# Patient Record
Sex: MALE | Employment: FULL TIME | URBAN - METROPOLITAN AREA
[De-identification: names, ages, dates, MRNs, and addresses within clinical notes are randomized per-mention and may not be internally consistent; named-entity substitution may affect disease eponyms.]

---

## 2017-10-10 ENCOUNTER — TRANSFERRED RECORDS (OUTPATIENT)
Dept: HEALTH INFORMATION MANAGEMENT | Facility: CLINIC | Age: 47
End: 2017-10-10

## 2018-05-03 ENCOUNTER — OFFICE VISIT (OUTPATIENT)
Dept: FAMILY MEDICINE | Facility: CLINIC | Age: 48
End: 2018-05-03
Payer: COMMERCIAL

## 2018-05-03 VITALS
SYSTOLIC BLOOD PRESSURE: 130 MMHG | BODY MASS INDEX: 26.67 KG/M2 | RESPIRATION RATE: 18 BRPM | OXYGEN SATURATION: 98 % | HEART RATE: 71 BPM | TEMPERATURE: 98.3 F | HEIGHT: 68 IN | WEIGHT: 176 LBS | DIASTOLIC BLOOD PRESSURE: 72 MMHG

## 2018-05-03 DIAGNOSIS — Z23 NEED FOR PROPHYLACTIC VACCINATION WITH TETANUS-DIPHTHERIA (TD): ICD-10-CM

## 2018-05-03 DIAGNOSIS — G47.00 INSOMNIA, UNSPECIFIED TYPE: ICD-10-CM

## 2018-05-03 DIAGNOSIS — F33.0 MILD EPISODE OF RECURRENT MAJOR DEPRESSIVE DISORDER (H): ICD-10-CM

## 2018-05-03 DIAGNOSIS — D17.30 LIPOMA OF SKIN AND SUBCUTANEOUS TISSUE: ICD-10-CM

## 2018-05-03 DIAGNOSIS — F10.21 HISTORY OF ALCOHOL DEPENDENCE (H): ICD-10-CM

## 2018-05-03 DIAGNOSIS — F41.9 ANXIETY: Primary | ICD-10-CM

## 2018-05-03 PROCEDURE — 99203 OFFICE O/P NEW LOW 30 MIN: CPT | Performed by: FAMILY MEDICINE

## 2018-05-03 RX ORDER — TRAZODONE HYDROCHLORIDE 150 MG/1
150 TABLET ORAL AT BEDTIME
Qty: 90 TABLET | Refills: 1 | Status: SHIPPED | OUTPATIENT
Start: 2018-05-03 | End: 2018-11-05

## 2018-05-03 RX ORDER — BUPROPION HYDROCHLORIDE 150 MG/1
150 TABLET ORAL EVERY MORNING
Qty: 90 TABLET | Refills: 1 | Status: SHIPPED | OUTPATIENT
Start: 2018-05-03 | End: 2018-11-05

## 2018-05-03 RX ORDER — BUSPIRONE HYDROCHLORIDE 15 MG/1
22.5 TABLET ORAL 2 TIMES DAILY
Qty: 270 TABLET | Refills: 1 | Status: SHIPPED | OUTPATIENT
Start: 2018-05-03 | End: 2018-11-05

## 2018-05-03 ASSESSMENT — ANXIETY QUESTIONNAIRES
GAD7 TOTAL SCORE: 3
2. NOT BEING ABLE TO STOP OR CONTROL WORRYING: SEVERAL DAYS
IF YOU CHECKED OFF ANY PROBLEMS ON THIS QUESTIONNAIRE, HOW DIFFICULT HAVE THESE PROBLEMS MADE IT FOR YOU TO DO YOUR WORK, TAKE CARE OF THINGS AT HOME, OR GET ALONG WITH OTHER PEOPLE: NOT DIFFICULT AT ALL
7. FEELING AFRAID AS IF SOMETHING AWFUL MIGHT HAPPEN: NOT AT ALL
5. BEING SO RESTLESS THAT IT IS HARD TO SIT STILL: NOT AT ALL
6. BECOMING EASILY ANNOYED OR IRRITABLE: NOT AT ALL
3. WORRYING TOO MUCH ABOUT DIFFERENT THINGS: SEVERAL DAYS
1. FEELING NERVOUS, ANXIOUS, OR ON EDGE: SEVERAL DAYS

## 2018-05-03 ASSESSMENT — PAIN SCALES - GENERAL: PAINLEVEL: NO PAIN (0)

## 2018-05-03 ASSESSMENT — PATIENT HEALTH QUESTIONNAIRE - PHQ9: 5. POOR APPETITE OR OVEREATING: NOT AT ALL

## 2018-05-03 NOTE — PROGRESS NOTES
"  SUBJECTIVE:   Edmar Forrest is a 47 year old male who presents to clinic today for the following health issues:      New patient/Transfer of Care from [place].   Clinic: [facility]   #[phone]    Depression and Anxiety Follow-Up    Status since last visit: No change    Other associated symptoms: Insomnia     Complicating factors:     Significant life event: Yes-  Moving from [place]      Current substance abuse: None    PHQ-9 5/3/2018   Total Score 0   Q9: Suicide Ideation Not at all     MANOHAR-7 SCORE 5/3/2018   Total Score 3     PHQ-9  English  PHQ-9   Any Language  MANOHAR-7  Suicide Assessment Five-step Evaluation and Treatment (SAFE-T)    Amount of exercise or physical activity: 6-7 days/week for an average of greater than 60 minutes    Problems taking medications regularly: No    Medication side effects: none    Diet: regular (no restrictions)    Pt is new to the clinic as he has recently moved from [place]. He needs refills for prescriptions today.     Mood: He has been taking Wellbutrin XL for about 4 years due to depression. Cardio and weight lifting also help control mood. Thinks he has been depressed for a long time, but did not realize until after becoming sober. Anxiety has been the biggest concern- taking buspirone for this. He just bought and sold two houses and is in the process of moving his parents to [place]. This is stressful but manageable/within normal limits. He has also been taking trazodone nightly for 4 years to help with sleep.   Denies: over-exercising    Etoh use: He has been sober for 4 years. Support group consists of family and exercise. Pt tried AA for a while but did not enjoy it. He has no urge to drink and is scared of relapse as he would \"lose everything.\"    Last physical was 2 years ago. Cholesterol and BP were elevated in the past as he was overweight/obese and an alcoholic- numbers have been better since sober.     Pt fell off a bed of a truck onto a forklift when " younger and is wondering if this could have caused the lipoma in his back. Lipoma doesn't cause him pain and he is unsure if it is changing in size.       Problem list and histories reviewed & adjusted, as indicated.  Additional history: as documented    Patient Active Problem List   Diagnosis     History of alcohol dependence (H)     Mild episode of recurrent major depressive disorder (H)     Anxiety     Lipoma of skin and subcutaneous tissue     Insomnia, unspecified type     Past Surgical History:   Procedure Laterality Date     ARTHROSCOPY KNEE RT/LT         Social History   Substance Use Topics     Smoking status: Never Smoker     Smokeless tobacco: Never Used     Alcohol use No     Family History   Problem Relation Age of Onset     Breast Cancer Mother 70     Hypertension Mother      HEART DISEASE Father      ? rhythm     Arthritis Father      Nephrolithiasis Father      Nephrolithiasis Sister      Chronic Obstructive Pulmonary Disease Brother       age 50 due to complications from copd. heavy smoker     Alcoholism Brother          Current Outpatient Prescriptions   Medication Sig Dispense Refill     buPROPion (WELLBUTRIN XL) 150 MG 24 hr tablet Take 1 tablet (150 mg) by mouth every morning 90 tablet 1     BUPROPION HCL PO Take 150 mg by mouth daily       busPIRone (BUSPAR) 15 MG tablet Take 1.5 tablets (22.5 mg) by mouth 2 times daily 270 tablet 1     BUSPIRONE HCL PO Take 15 mg by mouth 2 times daily 1- tabs       traZODone (DESYREL) 150 MG tablet Take 1 tablet (150 mg) by mouth At Bedtime 90 tablet 1     TRAZODONE HCL PO Take 150 mg by mouth At Bedtime       No Known Allergies    Reviewed and updated as needed this visit by clinical staff  Tobacco  Allergies  Meds  Med Hx  Surg Hx  Fam Hx  Soc Hx      Reviewed and updated as needed this visit by Provider Tobacco  Allergies  Meds  Med Hx  Surg Hx  Fam Hx  Soc Hx            ROS:  Constitutional, HEENT, cardiovascular, pulmonary, gi and  "gu systems are negative, except as otherwise noted.    This document serves as a record of the services and decisions personally performed and made by Hortencia Landers MD. It was created on her behalf by Karyna Aguero, a trained medical scribe. The creation of this document is based the provider's statements to the medical scribe.  Karyna Aguero May 3, 2018 8:18 AM      OBJECTIVE:     /72 (BP Location: Right arm, Patient Position: Sitting, Cuff Size: Adult Large)  Pulse 71  Temp 98.3  F (36.8  C) (Oral)  Resp 18  Ht 1.725 m (5' 7.91\")  Wt 79.8 kg (176 lb)  SpO2 98%  BMI 26.83 kg/m2  Body mass index is 26.83 kg/(m^2).  GENERAL: healthy, alert and no distress, overweight  NECK: no adenopathy, no asymmetry, masses, or scars and thyroid normal to palpation  RESP: lungs clear to auscultation - no rales, rhonchi or wheezes  CV: regular rate and rhythm, normal S1 S2, no S3 or S4, no murmur, click or rub, no peripheral edema and peripheral pulses strong  MS: 6-7 cm subcutaneous soft, smooth mass of left mid/upper back.   SKIN: no suspicious lesions or rashes to visible skin  PSYCH: mentation appears normal, affect normal/bright    Diagnostic Test Results:  No results found for this or any previous visit (from the past 24 hour(s)).    ASSESSMENT/PLAN:     1. Anxiety  2. Mild episode of recurrent major depressive disorder (H)  Has been well Controlled. Continue same medication.   - buPROPion (WELLBUTRIN XL) 150 MG 24 hr tablet; Take 1 tablet (150 mg) by mouth every morning  Dispense: 90 tablet; Refill: 1  - traZODone (DESYREL) 150 MG tablet; Take 1 tablet (150 mg) by mouth At Bedtime  Dispense: 90 tablet; Refill: 1    3. History of alcohol dependence (H)   sober for 4 years. Not interested in AA or other formal f/u care    4. Need for prophylactic vaccination with tetanus-diphtheria (TD)  Thinks he is utd. will have records sent from pharmacy and previous clinic.     5. Lipoma of skin and subcutaneous " tissue  pt declines f/u for this. Reviewed removal option- would refer to gen surgery. He will f/u if growing or becoming painful    6. Insomnia, unspecified type   Controlled. Continue same medication.   - traZODone (DESYREL) 150 MG tablet; Take 1 tablet (150 mg) by mouth At Bedtime  Dispense: 90 tablet; Refill: 1    Patient Instructions   Med check in 6 months (Jc).  Schedule this as a physical.       The information in this document, created by the medical scribe for me, accurately reflects the services I personally performed and the decisions made by me. I have reviewed and approved this document for accuracy.   MD Hortencia Mart MD  [facility]

## 2018-05-03 NOTE — MR AVS SNAPSHOT
"              After Visit Summary   5/3/2018    Edmar Forrest    MRN: [id number]           Patient Information     Date Of Birth          1970        Visit Information        Provider Department      5/3/2018 7:40 AM Hortencia Landers MD [facility]        Today's Diagnoses     Anxiety    -  1    Mild episode of recurrent major depressive disorder (H)        History of alcohol dependence (H)        Need for prophylactic vaccination with tetanus-diphtheria (TD)        Lipoma of skin and subcutaneous tissue        Insomnia, unspecified type          Care Instructions    Med check in 6 months (Jc).  Schedule this as a physical.           Follow-ups after your visit        Who to contact     If you have questions or need follow up information about today's clinic visit or your schedule please contact [facility] directly at [phone].  Normal or non-critical lab and imaging results will be communicated to you by [employer], letter or phone within 4 business days after the clinic has received the results. If you do not hear from us within 7 days, please contact the clinic through [employer] or phone. If you have a critical or abnormal lab result, we will notify you by phone as soon as possible.  Submit refill requests through [employer] or call your pharmacy and they will forward the refill request to us. Please allow 3 business days for your refill to be completed.          Additional Information About Your Visit        [employer] Information     [employer] lets you send messages to your doctor, view your test results, renew your prescriptions, schedule appointments and more. To sign up, go to [email or web address] . Click on \"Log in\" on the left side of the screen, which will take you to the Welcome page. Then click on \"Sign up Now\" on the right side of the page.     You will be asked to enter the access code listed below, as well as some personal information. Please follow " "the directions to create your username and password.     Your access code is: [email or web address]  Expires: 2018  8:46 AM     Your access code will  in 90 days. If you need help or a new code, please call your [facility] clinic or [phone].        Care EveryWhere ID     This is your Care EveryWhere ID. This could be used by other organizations to access your [facility] medical records  [id number]        Your Vitals Were     Pulse Temperature Respirations Height Pulse Oximetry BMI (Body Mass Index)    71 98.3  F (36.8  C) (Oral) 18 1.725 m (5' 7.91\") 98% 26.83 kg/m2       Blood Pressure from Last 3 Encounters:   18 130/72    Weight from Last 3 Encounters:   18 79.8 kg (176 lb)              Today, you had the following     No orders found for display         Today's Medication Changes          These changes are accurate as of 5/3/18  8:46 AM.  If you have any questions, ask your nurse or doctor.               These medicines have changed or have updated prescriptions.        Dose/Directions    * BUPROPION HCL PO   This may have changed:  Another medication with the same name was added. Make sure you understand how and when to take each.   Changed by:  Hortencia Landers MD        Dose:  150 mg   Take 150 mg by mouth daily   Refills:  0       * buPROPion 150 MG 24 hr tablet   Commonly known as:  WELLBUTRIN XL   This may have changed:  You were already taking a medication with the same name, and this prescription was added. Make sure you understand how and when to take each.   Used for:  Mild episode of recurrent major depressive disorder (H)   Changed by:  Hortencia Landers MD        Dose:  150 mg   Take 1 tablet (150 mg) by mouth every morning   Quantity:  90 tablet   Refills:  1       * BUSPIRONE HCL PO   This may have changed:  Another medication with the same name was added. Make sure you understand how and when to take each.   Changed by:  Hortencia Landers MD        " Dose:  15 mg   Take 15 mg by mouth 2 times daily 1-1/12 tabs   Refills:  0       * busPIRone 15 MG tablet   Commonly known as:  BUSPAR   This may have changed:  You were already taking a medication with the same name, and this prescription was added. Make sure you understand how and when to take each.   Used for:  Anxiety   Changed by:  Hortencia Landers MD        Dose:  22.5 mg   Take 1.5 tablets (22.5 mg) by mouth 2 times daily   Quantity:  270 tablet   Refills:  1       * TRAZODONE HCL PO   This may have changed:  Another medication with the same name was added. Make sure you understand how and when to take each.   Changed by:  Hortencia Landers MD        Dose:  150 mg   Take 150 mg by mouth At Bedtime   Refills:  0       * traZODone 150 MG tablet   Commonly known as:  DESYREL   This may have changed:  You were already taking a medication with the same name, and this prescription was added. Make sure you understand how and when to take each.   Used for:  Mild episode of recurrent major depressive disorder (H), Insomnia, unspecified type   Changed by:  Hortencia Landers MD        Dose:  150 mg   Take 1 tablet (150 mg) by mouth At Bedtime   Quantity:  90 tablet   Refills:  1       * Notice:  This list has 6 medication(s) that are the same as other medications prescribed for you. Read the directions carefully, and ask your doctor or other care provider to review them with you.         Where to get your medicines      These medications were sent to [id number] - [place], [place] - [address]  [address] [place] [address]     Phone:  [phone]     buPROPion 150 MG 24 hr tablet    busPIRone 15 MG tablet    traZODone 150 MG tablet                Primary Care Provider Office Phone # Fax #    [facility] [phone]       [address] [facility] [address]        Equal Access to Services     MAO HERNANDEZ AH: Willa campbell  Socheikhali, wazarinada luqadaha, qaybta kaalmada sabrina, addie hearn jaspreetbel laAkshatmark [place]ano. So [facility] [phone].    ATENCIÓN: Si efraín moses, tiene a capps disposición servicios gratuitos de asistencia lingüística. Shiela al [phone].    We comply with applicable federal civil rights laws and [place] laws. We do not discriminate on the basis of race, color, national origin, age, disability, sex, sexual orientation, or gender identity.            Thank you!     Thank you for choosing [facility]  for your care. Our goal is always to provide you with excellent care. Hearing back from our patients is one way we can continue to improve our services. Please take a few minutes to complete the written survey that you may receive in the mail after your visit with us. Thank you!             Your Updated Medication List - Protect others around you: Learn how to safely use, store and throw away your medicines at www.disposemymeds.org.          This list is accurate as of 5/3/18  8:46 AM.  Always use your most recent med list.                   Brand Name Dispense Instructions for use Diagnosis    * BUPROPION HCL PO      Take 150 mg by mouth daily        * buPROPion 150 MG 24 hr tablet    WELLBUTRIN XL    90 tablet    Take 1 tablet (150 mg) by mouth every morning    Mild episode of recurrent major depressive disorder (H)       * BUSPIRONE HCL PO      Take 15 mg by mouth 2 times daily 1-1/12 tabs        * busPIRone 15 MG tablet    BUSPAR    270 tablet    Take 1.5 tablets (22.5 mg) by mouth 2 times daily    Anxiety       * TRAZODONE HCL PO      Take 150 mg by mouth At Bedtime        * traZODone 150 MG tablet    DESYREL    90 tablet    Take 1 tablet (150 mg) by mouth At Bedtime    Mild episode of recurrent major depressive disorder (H), Insomnia, unspecified type       * Notice:  This list has 6 medication(s) that are the same as other medications prescribed for you. Read the directions carefully, and ask  your doctor or other care provider to review them with you.

## 2018-05-04 PROBLEM — G47.00 INSOMNIA, UNSPECIFIED TYPE: Status: ACTIVE | Noted: 2018-05-04

## 2018-05-04 ASSESSMENT — PATIENT HEALTH QUESTIONNAIRE - PHQ9: SUM OF ALL RESPONSES TO PHQ QUESTIONS 1-9: 0

## 2018-05-04 ASSESSMENT — ANXIETY QUESTIONNAIRES: GAD7 TOTAL SCORE: 3

## 2018-10-11 ENCOUNTER — DOCUMENTATION ONLY (OUTPATIENT)
Dept: LAB | Facility: CLINIC | Age: 48
End: 2018-10-11

## 2018-10-11 DIAGNOSIS — F33.0 MILD EPISODE OF RECURRENT MAJOR DEPRESSIVE DISORDER (H): ICD-10-CM

## 2018-10-11 DIAGNOSIS — F10.21 HISTORY OF ALCOHOL DEPENDENCE (H): Primary | ICD-10-CM

## 2018-10-11 DIAGNOSIS — F41.9 ANXIETY: ICD-10-CM

## 2018-10-11 DIAGNOSIS — Z00.00 ENCOUNTER FOR ROUTINE ADULT HEALTH EXAMINATION WITHOUT ABNORMAL FINDINGS: ICD-10-CM

## 2018-10-11 NOTE — PROGRESS NOTES
Please place or confirm orders for upcoming lab appointment on 10/25/2018 patient coming in for pre-visit labs    Thank You  Rajwinder MCPHERSON CMA.

## 2018-10-29 DIAGNOSIS — F33.0 MILD EPISODE OF RECURRENT MAJOR DEPRESSIVE DISORDER (H): ICD-10-CM

## 2018-10-29 DIAGNOSIS — F41.9 ANXIETY: ICD-10-CM

## 2018-10-29 DIAGNOSIS — Z00.00 ENCOUNTER FOR ROUTINE ADULT HEALTH EXAMINATION WITHOUT ABNORMAL FINDINGS: ICD-10-CM

## 2018-10-29 DIAGNOSIS — F10.21 HISTORY OF ALCOHOL DEPENDENCE (H): ICD-10-CM

## 2018-10-29 LAB
ALBUMIN SERPL-MCNC: 3.8 G/DL (ref 3.4–5)
ALP SERPL-CCNC: 34 U/L (ref 40–150)
ALT SERPL W P-5'-P-CCNC: 40 U/L (ref 0–70)
ANION GAP SERPL CALCULATED.3IONS-SCNC: 6 MMOL/L (ref 3–14)
AST SERPL W P-5'-P-CCNC: 24 U/L (ref 0–45)
BASOPHILS # BLD AUTO: 0.1 10E9/L (ref 0–0.2)
BASOPHILS NFR BLD AUTO: 1.8 %
BILIRUB SERPL-MCNC: 0.9 MG/DL (ref 0.2–1.3)
BUN SERPL-MCNC: 15 MG/DL (ref 7–30)
CALCIUM SERPL-MCNC: 8.5 MG/DL (ref 8.5–10.1)
CHLORIDE SERPL-SCNC: 109 MMOL/L (ref 94–109)
CHOLEST SERPL-MCNC: 183 MG/DL
CO2 SERPL-SCNC: 29 MMOL/L (ref 20–32)
CREAT SERPL-MCNC: 0.9 MG/DL (ref 0.66–1.25)
DIFFERENTIAL METHOD BLD: NORMAL
EOSINOPHIL # BLD AUTO: 0.5 10E9/L (ref 0–0.7)
EOSINOPHIL NFR BLD AUTO: 10 %
ERYTHROCYTE [DISTWIDTH] IN BLOOD BY AUTOMATED COUNT: 12.6 % (ref 10–15)
GFR SERPL CREATININE-BSD FRML MDRD: 90 ML/MIN/1.7M2
GLUCOSE SERPL-MCNC: 103 MG/DL (ref 70–99)
HCT VFR BLD AUTO: 42.4 % (ref 40–53)
HDLC SERPL-MCNC: 79 MG/DL
HGB BLD-MCNC: 14.1 G/DL (ref 13.3–17.7)
LDLC SERPL CALC-MCNC: 94 MG/DL
LYMPHOCYTES # BLD AUTO: 2.1 10E9/L (ref 0.8–5.3)
LYMPHOCYTES NFR BLD AUTO: 42.1 %
MCH RBC QN AUTO: 32 PG (ref 26.5–33)
MCHC RBC AUTO-ENTMCNC: 33.3 G/DL (ref 31.5–36.5)
MCV RBC AUTO: 96 FL (ref 78–100)
MONOCYTES # BLD AUTO: 0.4 10E9/L (ref 0–1.3)
MONOCYTES NFR BLD AUTO: 8.6 %
NEUTROPHILS # BLD AUTO: 1.9 10E9/L (ref 1.6–8.3)
NEUTROPHILS NFR BLD AUTO: 37.5 %
NONHDLC SERPL-MCNC: 104 MG/DL
PLATELET # BLD AUTO: 276 10E9/L (ref 150–450)
POTASSIUM SERPL-SCNC: 4.3 MMOL/L (ref 3.4–5.3)
PROT SERPL-MCNC: 6.2 G/DL (ref 6.8–8.8)
RBC # BLD AUTO: 4.4 10E12/L (ref 4.4–5.9)
SODIUM SERPL-SCNC: 144 MMOL/L (ref 133–144)
TRIGL SERPL-MCNC: 51 MG/DL
TSH SERPL DL<=0.005 MIU/L-ACNC: 0.96 MU/L (ref 0.4–4)
WBC # BLD AUTO: 5 10E9/L (ref 4–11)

## 2018-10-29 PROCEDURE — 84443 ASSAY THYROID STIM HORMONE: CPT | Performed by: FAMILY MEDICINE

## 2018-10-29 PROCEDURE — 80061 LIPID PANEL: CPT | Performed by: FAMILY MEDICINE

## 2018-10-29 PROCEDURE — 85025 COMPLETE CBC W/AUTO DIFF WBC: CPT | Performed by: FAMILY MEDICINE

## 2018-10-29 PROCEDURE — 80053 COMPREHEN METABOLIC PANEL: CPT | Performed by: FAMILY MEDICINE

## 2018-10-29 PROCEDURE — 36415 COLL VENOUS BLD VENIPUNCTURE: CPT | Performed by: FAMILY MEDICINE

## 2018-11-05 ENCOUNTER — OFFICE VISIT (OUTPATIENT)
Dept: FAMILY MEDICINE | Facility: CLINIC | Age: 48
End: 2018-11-05
Payer: COMMERCIAL

## 2018-11-05 VITALS
HEIGHT: 68 IN | OXYGEN SATURATION: 98 % | HEART RATE: 78 BPM | RESPIRATION RATE: 16 BRPM | WEIGHT: 180 LBS | BODY MASS INDEX: 27.28 KG/M2 | SYSTOLIC BLOOD PRESSURE: 123 MMHG | TEMPERATURE: 98.3 F | DIASTOLIC BLOOD PRESSURE: 75 MMHG

## 2018-11-05 DIAGNOSIS — Z00.00 ENCOUNTER FOR ROUTINE ADULT HEALTH EXAMINATION WITHOUT ABNORMAL FINDINGS: Primary | ICD-10-CM

## 2018-11-05 DIAGNOSIS — F41.9 ANXIETY: ICD-10-CM

## 2018-11-05 DIAGNOSIS — F33.0 MILD EPISODE OF RECURRENT MAJOR DEPRESSIVE DISORDER (H): ICD-10-CM

## 2018-11-05 DIAGNOSIS — R13.10 DYSPHAGIA, UNSPECIFIED TYPE: ICD-10-CM

## 2018-11-05 DIAGNOSIS — K21.9 GASTROESOPHAGEAL REFLUX DISEASE, ESOPHAGITIS PRESENCE NOT SPECIFIED: ICD-10-CM

## 2018-11-05 DIAGNOSIS — R73.01 ELEVATED FASTING GLUCOSE: ICD-10-CM

## 2018-11-05 DIAGNOSIS — G47.00 INSOMNIA, UNSPECIFIED TYPE: ICD-10-CM

## 2018-11-05 DIAGNOSIS — Z23 NEED FOR PROPHYLACTIC VACCINATION AND INOCULATION AGAINST INFLUENZA: ICD-10-CM

## 2018-11-05 DIAGNOSIS — F10.21 HISTORY OF ALCOHOL DEPENDENCE (H): ICD-10-CM

## 2018-11-05 PROCEDURE — 90471 IMMUNIZATION ADMIN: CPT | Performed by: FAMILY MEDICINE

## 2018-11-05 PROCEDURE — 90686 IIV4 VACC NO PRSV 0.5 ML IM: CPT | Performed by: FAMILY MEDICINE

## 2018-11-05 PROCEDURE — 99396 PREV VISIT EST AGE 40-64: CPT | Mod: 25 | Performed by: FAMILY MEDICINE

## 2018-11-05 RX ORDER — TRAZODONE HYDROCHLORIDE 150 MG/1
150 TABLET ORAL AT BEDTIME
Qty: 90 TABLET | Refills: 1 | Status: SHIPPED | OUTPATIENT
Start: 2018-11-05 | End: 2019-04-10

## 2018-11-05 RX ORDER — BUSPIRONE HYDROCHLORIDE 15 MG/1
TABLET ORAL
Qty: 360 TABLET | Refills: 1 | Status: SHIPPED | OUTPATIENT
Start: 2018-11-05 | End: 2019-04-10

## 2018-11-05 RX ORDER — BUPROPION HYDROCHLORIDE 150 MG/1
150 TABLET ORAL EVERY MORNING
Qty: 90 TABLET | Refills: 1 | Status: SHIPPED | OUTPATIENT
Start: 2018-11-05 | End: 2019-01-03

## 2018-11-05 ASSESSMENT — ANXIETY QUESTIONNAIRES
2. NOT BEING ABLE TO STOP OR CONTROL WORRYING: MORE THAN HALF THE DAYS
5. BEING SO RESTLESS THAT IT IS HARD TO SIT STILL: MORE THAN HALF THE DAYS
3. WORRYING TOO MUCH ABOUT DIFFERENT THINGS: MORE THAN HALF THE DAYS
IF YOU CHECKED OFF ANY PROBLEMS ON THIS QUESTIONNAIRE, HOW DIFFICULT HAVE THESE PROBLEMS MADE IT FOR YOU TO DO YOUR WORK, TAKE CARE OF THINGS AT HOME, OR GET ALONG WITH OTHER PEOPLE: SOMEWHAT DIFFICULT
6. BECOMING EASILY ANNOYED OR IRRITABLE: NOT AT ALL
GAD7 TOTAL SCORE: 8
1. FEELING NERVOUS, ANXIOUS, OR ON EDGE: MORE THAN HALF THE DAYS
7. FEELING AFRAID AS IF SOMETHING AWFUL MIGHT HAPPEN: NOT AT ALL

## 2018-11-05 ASSESSMENT — PATIENT HEALTH QUESTIONNAIRE - PHQ9
SUM OF ALL RESPONSES TO PHQ QUESTIONS 1-9: 0
5. POOR APPETITE OR OVEREATING: NOT AT ALL

## 2018-11-05 ASSESSMENT — PAIN SCALES - GENERAL: PAINLEVEL: NO PAIN (0)

## 2018-11-05 NOTE — PROGRESS NOTES
"  SUBJECTIVE:   CC: Edmar Forrest is an 48 year old male who presents for preventative health visit.     Healthy Habits:    Do you get at least three servings of calcium containing foods daily (dairy, green leafy vegetables, etc.)? yes    Amount of exercise or daily activities, outside of work: 7 day(s) per week    Problems taking medications regularly No    Medication side effects: No    Have you had an eye exam in the past two years? no    Do you see a dentist twice per year? no    Do you have sleep apnea, excessive snoring or daytime drowsiness?no    Mood:  -Patient reports he started a new job that is extremely stressful, and he is more anxious than he has been in a long time, since he stopped drinking. Patient denies any temptation to use alcohol, he describes being \"terrified\" of going back to alcohol due to his history of drinking   -First started Wellbutrin when he stopped using alcohol   -Has worked with counseling in the past and did not find it particularly helpful. Patient talks with his wife about his feelings and finds that is helpful   -Sleeping normally, about 6-7 hours nightly   -Denies depression symptoms     Dysphagia:  -Complains of occasionally feeling like food gets stuck in his upper chest while eating; notes about 20 episodes since he first noticed it 6-7 months ago. He feels he has to regurgitate the food because sometimes even water cannot push it down  -Denies that episodes of dysphagia are connected to anxiety   -Patient has history of esophageal perforation after excessive vomiting while intoxicated. Patient has not had an endoscopy since this episode  -Take Prilosec in the morning and Zantac in the evening for chronic reflux; feels the reflux is controlled with these medications     Yawning:  -Notes constant yawning throughout the day regardless of fatigue symptoms. He does not know how long this has been happening, but only notices it while at work     Today's PHQ-2 Score:   PHQ-2 " (  [employer]) 5/3/2018   Q1: Little interest or pleasure in doing things 0   Q2: Feeling down, depressed or hopeless 0   PHQ-2 Score 0       Abuse: Current or Past(Physical, Sexual or Emotional)- No  Do you feel safe in your environment - Yes    Social History   Substance Use Topics     Smoking status: Never Smoker     Smokeless tobacco: Never Used     Alcohol use No      If you drink alcohol do you typically have >3 drinks per day or >7 drinks per week? No                      Last PSA: No results found for: PSA    Reviewed orders with patient. Reviewed health maintenance and updated orders accordingly - Yes  Patient Active Problem List   Diagnosis     History of alcohol dependence (H)     Mild episode of recurrent major depressive disorder (H)     Anxiety     Lipoma of skin and subcutaneous tissue     Insomnia, unspecified type     Past Surgical History:   Procedure Laterality Date     ARTHROSCOPY KNEE RT/LT         Social History   Substance Use Topics     Smoking status: Never Smoker     Smokeless tobacco: Never Used     Alcohol use No     Family History   Problem Relation Age of Onset     Breast Cancer Mother 70     Hypertension Mother      HEART DISEASE Father      ? rhythm     Arthritis Father      Nephrolithiasis Father      Nephrolithiasis Sister      Chronic Obstructive Pulmonary Disease Brother       age 50 due to complications from copd. heavy smoker     Alcoholism Brother            Reviewed and updated as needed this visit by clinical staff  Tobacco  Allergies  Meds  Med Hx  Surg Hx  Fam Hx  Soc Hx        Reviewed and updated as needed this visit by Provider        Past Medical History:   Diagnosis Date     Anemia     secondary to esophageal perforation     Cardiac arrest (H)     due to esophageal perforation     Esophageal perforation     due to excessive vomiting      Past Surgical History:   Procedure Laterality Date     ARTHROSCOPY KNEE RT/LT    "      ROS:  Constitutional, HEENT, cardiovascular, pulmonary, GI, , musculoskeletal, neuro, skin, endocrine and psych systems are negative, except as otherwise noted.    This document serves as a record of the services and decisions personally performed by KAYLIE MCCORD. It was created on his/her behalf by Maya Dumont, a trained medical scribe. The creation of this document is based on the provider's statements to the medical scribe. Maya Dumont, November 5, 2018 7:15 AM  OBJECTIVE:   /75 (BP Location: Right arm, Patient Position: Chair, Cuff Size: Adult Large)  Pulse 78  Temp 98.3  F (36.8  C) (Oral)  Resp 16  Ht 1.727 m (5' 8\")  Wt 81.6 kg (180 lb)  SpO2 98%  BMI 27.37 kg/m2  EXAM:  GENERAL: healthy, alert and no distress  EYES: Eyes grossly normal to inspection, PERRL and conjunctivae and sclerae normal  HENT: ear canals and TM's normal, nose and mouth without ulcers or lesions  NECK: no adenopathy, no asymmetry, masses, or scars and thyroid normal to palpation  RESP: lungs clear to auscultation - no rales, rhonchi or wheezes  CV: regular rate and rhythm, normal S1 S2, no S3 or S4, no murmur, click or rub, no peripheral edema and peripheral pulses strong  ABDOMEN: soft, nontender, no hepatosplenomegaly, no masses and bowel sounds normal   (male): normal male genitalia without lesions or urethral discharge, no hernia  RECTAL (male): normal sphincter tone, no rectal masses, prostate normal size, smooth, nontender without nodules or masses  MS: no gross musculoskeletal defects noted, no edema  SKIN: 6-7 cm subcutaneous soft, smooth mass of left mid/upper back. no suspicious lesions or rashes  NEURO: Normal strength and tone, mentation intact and speech normal  PSYCH: mentation appears normal, affect normal/bright      Results for orders placed or performed in visit on 10/29/18   Lipid panel reflex to direct LDL Fasting   Result Value Ref Range    Cholesterol 183 <200 mg/dL    " Triglycerides 51 <150 mg/dL    HDL Cholesterol 79 >39 mg/dL    LDL Cholesterol Calculated 94 <100 mg/dL    Non HDL Cholesterol 104 <130 mg/dL   **Comprehensive metabolic panel FUTURE anytime   Result Value Ref Range    Sodium 144 133 - 144 mmol/L    Potassium 4.3 3.4 - 5.3 mmol/L    Chloride 109 94 - 109 mmol/L    Carbon Dioxide 29 20 - 32 mmol/L    Anion Gap 6 3 - 14 mmol/L    Glucose 103 (H) 70 - 99 mg/dL    Urea Nitrogen 15 7 - 30 mg/dL    Creatinine 0.90 0.66 - 1.25 mg/dL    GFR Estimate 90 >60 mL/min/1.7m2    GFR Estimate If Black >90 >60 mL/min/1.7m2    Calcium 8.5 8.5 - 10.1 mg/dL    Bilirubin Total 0.9 0.2 - 1.3 mg/dL    Albumin 3.8 3.4 - 5.0 g/dL    Protein Total 6.2 (L) 6.8 - 8.8 g/dL    Alkaline Phosphatase 34 (L) 40 - 150 U/L    ALT 40 0 - 70 U/L    AST 24 0 - 45 U/L   CBC with platelets differential   Result Value Ref Range    WBC 5.0 4.0 - 11.0 10e9/L    RBC Count 4.40 4.4 - 5.9 10e12/L    Hemoglobin 14.1 13.3 - 17.7 g/dL    Hematocrit 42.4 40.0 - 53.0 %    MCV 96 78 - 100 fl    MCH 32.0 26.5 - 33.0 pg    MCHC 33.3 31.5 - 36.5 g/dL    RDW 12.6 10.0 - 15.0 %    Platelet Count 276 150 - 450 10e9/L    % Neutrophils 37.5 %    % Lymphocytes 42.1 %    % Monocytes 8.6 %    % Eosinophils 10.0 %    % Basophils 1.8 %    Absolute Neutrophil 1.9 1.6 - 8.3 10e9/L    Absolute Lymphocytes 2.1 0.8 - 5.3 10e9/L    Absolute Monocytes 0.4 0.0 - 1.3 10e9/L    Absolute Eosinophils 0.5 0.0 - 0.7 10e9/L    Absolute Basophils 0.1 0.0 - 0.2 10e9/L    Diff Method Automated Method    **TSH with free T4 reflex FUTURE anytime   Result Value Ref Range    TSH 0.96 0.40 - 4.00 mU/L     ASSESSMENT/PLAN:   1. Encounter for routine adult health examination without abnormal findings  Declined HIV screening as he does not feel he has any risk     2. Mild episode of recurrent major depressive disorder (H)  Controlled. Continue current medications   - buPROPion (WELLBUTRIN XL) 150 MG 24 hr tablet; Take 1 tablet (150 mg) by mouth every  morning  Dispense: 90 tablet; Refill: 1  - traZODone (DESYREL) 150 MG tablet; Take 1 tablet (150 mg) by mouth At Bedtime  Dispense: 90 tablet; Refill: 1    3. Anxiety  Increased due to work stress. Trial Buspar increase for better symptom control. Patient declined meeting with counseling. Follow-up visit in one month. Reviewed onset of action of meds, common side effects and plan for close f/u. Encouraged call to clinic if symptoms worsening or adverse reactions.   - busPIRone (BUSPAR) 15 MG tablet; TWO TABLETS IN THE MORNING AND 1.5 TABLETS IN THE AFTERNOON. iF NEEDED, INCREASE TO TWO TABLETS TWICE DAILY AFTER TWO WEEKS  Dispense: 360 tablet; Refill: 1    4. Insomnia, unspecified type  Controlled. Continue current medication   - traZODone (DESYREL) 150 MG tablet; Take 1 tablet (150 mg) by mouth At Bedtime  Dispense: 90 tablet; Refill: 1    5. History of alcohol dependence (H)  Stable. Continues to be sober for 4 years     6. Gastroesophageal reflux disease, esophagitis presence not specified  7. Dysphagia, unspecified type  Dysphagia due to ? Esophageal stenosis vs other Would like patient to meet with GI for consult prior to endoscopy given history of esophageal perforation   Ok for anesthesia for upper endoscopy  - GASTROENTEROLOGY ADULT REF CONSULT ONLY    8. Elevated fasting glucose  Patient counseled on nutrition     9. Need for prophylactic vaccination and inoculation against influenza  - FLU VACCINE, SPLIT VIRUS, IM (QUADRIVALENT) [95239]- >3 YRS  - Vaccine Administration, Initial [32966]    COUNSELING:  Reviewed preventive health counseling, as reflected in patient instructions  Special attention given to:        Regular exercise       Healthy diet/nutrition       Vision screening       Hearing screening       Alcohol Use       HIV screeninx in teen years, 1x in adult years, and at intervals if high risk    BP Readings from Last 1 Encounters:   18 123/75     Estimated body mass index is 27.37  "kg/(m^2) as calculated from the following:    Height as of this encounter: 1.727 m (5' 8\").    Weight as of this encounter: 81.6 kg (180 lb).    BP Screening:   Last 3 BP Readings:    BP Readings from Last 3 Encounters:   11/05/18 123/75   05/03/18 130/72       The following was recommended to the patient:  Re-screen BP within a year and recommended lifestyle modifications  Weight management plan: Discussed healthy diet and exercise guidelines and patient will follow up in 12 months in clinic to re-evaluate.     reports that he has never smoked. He has never used smokeless tobacco.    Patient Instructions     Start by increasing your Buspar to two tablets in the morning and one and a half in the afternoon. If that is not enough, you can increase to two tablets twice daily.    Follow-up visit in one month to see how your anxiety is doing with the increased Buspar.  We can do a phone or office visit.     At [facility], we strive to deliver an exceptional experience to you, every time we see you.  If you receive a survey in the mail, please send us back your thoughts. We really do value your feedback.    Your care team:     Family Medicine   LAUREANO Wallace MD Emily Bunt, APRN CNP   S. MD Felicitas Pozo MD Angela Wermerskirchen, MD         Clinic hours: Monday - Wednesday 7 am-7 pm   Thursdays and Fridays 7 am-5 pm.     [facility]: Monday - Friday 11 am-9 pm,   Saturday and Sunday 9 am-5 pm.    [facility]: Monday -Thursday 8 am-8 pm; Friday 8 am-6 pm; Saturday and Sunday 9 am-5 pm.     [facility]: Monday - Thursday 8 am - 7 pm; Friday 8 am - 6 pm    Clinic: [phone]   [facility]: [phone]   [facility]: [phone]              Preventive Health Recommendations  Male Ages 40 to 49    Yearly exam:             See your health care provider every year in order " to  o   Review health changes.   o   Discuss preventive care.    o   Review your medicines if your doctor has prescribed any.    You should be tested each year for STDs (sexually transmitted diseases) if you re at risk.     Have a cholesterol test every 5 years.     Have a colonoscopy (test for colon cancer) if someone in your family has had colon cancer or polyps before age 50.     After age 45, have a diabetes test (fasting glucose). If you are at risk for diabetes, you should have this test every 3 years.      Talk with your health care provider about whether or not a prostate cancer screening test (PSA) is right for you.    Shots: Get a flu shot each year. Get a tetanus shot every 10 years.     Nutrition:    Eat at least 5 servings of fruits and vegetables daily.     Eat whole-grain bread, whole-wheat pasta and brown rice instead of white grains and rice.     Get adequate Calcium and Vitamin D.     Lifestyle    Exercise for at least 150 minutes a week (30 minutes a day, 5 days a week). This will help you control your weight and prevent disease.     Limit alcohol to one drink per day.     No smoking.     Wear sunscreen to prevent skin cancer.     See your dentist every six months for an exam and cleaning.        Counseling Resources:  ATP IV Guidelines  Pooled Cohorts Equation Calculator  FRAX Risk Assessment  ICSI Preventive Guidelines  Dietary Guidelines for Americans, 2010  USDA's MyPlate  ASA Prophylaxis  Lung CA Screening    The information in this document, created by the medical scribe for me, accurately reflects the services I personally performed and the decisions made by me. I have reviewed and approved this document for accuracy.   Hortencia Landers MD  [facility]

## 2018-11-05 NOTE — PATIENT INSTRUCTIONS
Start by increasing your Buspar to two tablets in the morning and one and a half in the afternoon. If that is not enough, you can increase to two tablets twice daily.    Follow-up visit in one month to see how your anxiety is doing with the increased Buspar.  We can do a phone or office visit.     At [facility], we strive to deliver an exceptional experience to you, every time we see you.  If you receive a survey in the mail, please send us back your thoughts. We really do value your feedback.    Your care team:     Family Medicine   LAUREANO Wallace MD Emily Bunt, APRN CNP S. MD Felicitas Pozo MD Angela Wermerskirchen, MD         Clinic hours: Monday - Wednesday 7 am-7 pm   Thursdays and Fridays 7 am-5 pm.     [facility]: Monday - Friday 11 am-9 pm,   Saturday and Sunday 9 am-5 pm.    [facility]: Monday -Thursday 8 am-8 pm; Friday 8 am-6 pm; Saturday and Sunday 9 am-5 pm.     [facility]: Monday - Thursday 8 am - 7 pm; Friday 8 am - 6 pm    Clinic: [phone]   [facility]: [phone]   [facility]: [phone]              Preventive Health Recommendations  Male Ages 40 to 49    Yearly exam:             See your health care provider every year in order to  o   Review health changes.   o   Discuss preventive care.    o   Review your medicines if your doctor has prescribed any.    You should be tested each year for STDs (sexually transmitted diseases) if you re at risk.     Have a cholesterol test every 5 years.     Have a colonoscopy (test for colon cancer) if someone in your family has had colon cancer or polyps before age 50.     After age 45, have a diabetes test (fasting glucose). If you are at risk for diabetes, you should have this test every 3 years.      Talk with your health care provider about whether or not a prostate cancer screening test (PSA) is  right for you.    Shots: Get a flu shot each year. Get a tetanus shot every 10 years.     Nutrition:    Eat at least 5 servings of fruits and vegetables daily.     Eat whole-grain bread, whole-wheat pasta and brown rice instead of white grains and rice.     Get adequate Calcium and Vitamin D.     Lifestyle    Exercise for at least 150 minutes a week (30 minutes a day, 5 days a week). This will help you control your weight and prevent disease.     Limit alcohol to one drink per day.     No smoking.     Wear sunscreen to prevent skin cancer.     See your dentist every six months for an exam and cleaning.

## 2018-11-05 NOTE — MR AVS SNAPSHOT
After Visit Summary   11/5/2018    Edmar Forrest    MRN: [id number]           Patient Information     Date Of Birth          1970        Visit Information        Provider Department      11/5/2018 7:00 AM Hortencia Landers MD [facility]        Today's Diagnoses     Encounter for routine adult health examination without abnormal findings    -  1    Mild episode of recurrent major depressive disorder (H)        Anxiety        Insomnia, unspecified type        History of alcohol dependence (H)        Gastroesophageal reflux disease, esophagitis presence not specified        Dysphagia, unspecified type        Elevated fasting glucose        Need for prophylactic vaccination and inoculation against influenza          Care Instructions    Start by increasing your Buspar to two tablets in the morning and one and a half in the afternoon. If that is not enough, you can increase to two tablets twice daily.    Follow-up visit in one month to see how your anxiety is doing with the increased Buspar.  We can do a phone or office visit.     At [facility], we strive to deliver an exceptional experience to you, every time we see you.  If you receive a survey in the mail, please send us back your thoughts. We really do value your feedback.    Your care team:     Family Medicine   LAUREANO Wallace MD Emily Bunt, APRN CNP   S. MD Felicitas Pozo MD Angela Wermerskirchen, MD         Clinic hours: Monday - Wednesday 7 am-7 pm   Thursdays and Fridays 7 am-5 pm.     [facility]: Monday - Friday 11 am-9 pm,   Saturday and Sunday 9 am-5 pm.    [facility]: Monday -Thursday 8 am-8 pm; Friday 8 am-6 pm; Saturday and Sunday 9 am-5 pm.     [facility]: Monday - Thursday 8 am - 7 pm; Friday 8 am - 6 pm    Clinic: [phone]   [facility]: [phone]   [facility]  Pharmacy: [phone]              Preventive Health Recommendations  Male Ages 40 to 49    Yearly exam:             See your health care provider every year in order to  o   Review health changes.   o   Discuss preventive care.    o   Review your medicines if your doctor has prescribed any.    You should be tested each year for STDs (sexually transmitted diseases) if you re at risk.     Have a cholesterol test every 5 years.     Have a colonoscopy (test for colon cancer) if someone in your family has had colon cancer or polyps before age 50.     After age 45, have a diabetes test (fasting glucose). If you are at risk for diabetes, you should have this test every 3 years.      Talk with your health care provider about whether or not a prostate cancer screening test (PSA) is right for you.    Shots: Get a flu shot each year. Get a tetanus shot every 10 years.     Nutrition:    Eat at least 5 servings of fruits and vegetables daily.     Eat whole-grain bread, whole-wheat pasta and brown rice instead of white grains and rice.     Get adequate Calcium and Vitamin D.     Lifestyle    Exercise for at least 150 minutes a week (30 minutes a day, 5 days a week). This will help you control your weight and prevent disease.     Limit alcohol to one drink per day.     No smoking.     Wear sunscreen to prevent skin cancer.     See your dentist every six months for an exam and cleaning.              Follow-ups after your visit        Additional Services     GASTROENTEROLOGY ADULT REF CONSULT ONLY       Preferred Location: [facility], [place]: [phone] and [place] [facility] [phone]      Please be aware that coverage of these services is subject to the terms and limitations of your health insurance plan.  Call member services at your health plan with any benefit or coverage questions.  Any procedures must be performed at a [facility] facility OR coordinated by your clinic's referral office.    Please bring the following with  "you to your appointment:    (1) Any X-Rays, CTs or MRIs which have been performed.  Contact the facility where they were done to arrange for  prior to your scheduled appointment.    (2) List of current medications   (3) This referral request   (4) Any documents/labs given to you for this referral                  Follow-up notes from your care team     Return in about 1 month (around 2018).      Who to contact     If you have questions or need follow up information about today's clinic visit or your schedule please contact [facility] directly at [phone].  Normal or non-critical lab and imaging results will be communicated to you by MyChart, letter or phone within 4 business days after the clinic has received the results. If you do not hear from us within 7 days, please contact the clinic through [employer] Inchart or phone. If you have a critical or abnormal lab result, we will notify you by phone as soon as possible.  Submit refill requests through Ygle or call your pharmacy and they will forward the refill request to us. Please allow 3 business days for your refill to be completed.          Additional Information About Your Visit        MyChart Information     Ygle lets you send messages to your doctor, view your test results, renew your prescriptions, schedule appointments and more. To sign up, go to [email or web address] . Click on \"Log in\" on the left side of the screen, which will take you to the Welcome page. Then click on \"Sign up Now\" on the right side of the page.     You will be asked to enter the access code listed below, as well as some personal information. Please follow the directions to create your username and password.     Your access code is: [email or web address]  Expires: 2/3/2019  7:54 AM     Your access code will  in 90 days. If you need help or a new code, please call your [facility] or [phone].        Care EveryWhere ID     This is your Care EveryWhere " "ID. This could be used by other organizations to access your [facility] medical records  [id number]        Your Vitals Were     Pulse Temperature Respirations Height Pulse Oximetry BMI (Body Mass Index)    78 98.3  F (36.8  C) (Oral) 16 1.727 m (5' 8\") 98% 27.37 kg/m2       Blood Pressure from Last 3 Encounters:   11/05/18 123/75   05/03/18 130/72    Weight from Last 3 Encounters:   11/05/18 81.6 kg (180 lb)   05/03/18 79.8 kg (176 lb)              We Performed the Following     FLU VACCINE, SPLIT VIRUS, IM (QUADRIVALENT) [40623]- >3 YRS     GASTROENTEROLOGY ADULT REF CONSULT ONLY     Vaccine Administration, Initial [93290]          Today's Medication Changes          These changes are accurate as of 11/5/18  7:59 AM.  If you have any questions, ask your nurse or doctor.               These medicines have changed or have updated prescriptions.        Dose/Directions    busPIRone 15 MG tablet   Commonly known as:  BUSPAR   This may have changed:    - how much to take  - how to take this  - when to take this  - additional instructions   Used for:  Anxiety   Changed by:  Hortencia Landers MD        TWO TABLETS IN THE MORNING AND 1.5 TABLETS IN THE AFTERNOON. iF NEEDED, INCREASE TO TWO TABLETS TWICE DAILY AFTER TWO WEEKS   Quantity:  360 tablet   Refills:  1            Where to get your medicines      These medications were sent to [id number] - [facility] [address]  [address] [address] [address]     Phone:  [phone]     buPROPion 150 MG 24 hr tablet    busPIRone 15 MG tablet    traZODone 150 MG tablet                Primary Care Provider Office Phone # Fax #    [facility] [phone] [address] [address]        Equal Access to Services     MAO HERNANDEZ AH: Willa Molina, waaxda luqadaha, qaybta kaalmada adeegyaviet, addie ríos. So wa " [phone].    ATENCIÓN: Si efraín moses, tiene a capps disposición servicios gratuitos de asistencia lingüística. Shiela vazquez [phone].    We comply with applicable federal civil rights laws and [place] laws. We do not discriminate on the basis of race, color, national origin, age, disability, sex, sexual orientation, or gender identity.            Thank you!     Thank you for choosing [facility]  for your care. Our goal is always to provide you with excellent care. Hearing back from our patients is one way we can continue to improve our services. Please take a few minutes to complete the written survey that you may receive in the mail after your visit with us. Thank you!             Your Updated Medication List - Protect others around you: Learn how to safely use, store and throw away your medicines at www.disposemymeds.org.          This list is accurate as of 11/5/18  7:59 AM.  Always use your most recent med list.                   Brand Name Dispense Instructions for use Diagnosis    buPROPion 150 MG 24 hr tablet    WELLBUTRIN XL    90 tablet    Take 1 tablet (150 mg) by mouth every morning    Mild episode of recurrent major depressive disorder (H)       busPIRone 15 MG tablet    BUSPAR    360 tablet    TWO TABLETS IN THE MORNING AND 1.5 TABLETS IN THE AFTERNOON. iF NEEDED, INCREASE TO TWO TABLETS TWICE DAILY AFTER TWO WEEKS    Anxiety       PRILOSEC PO      Take 20 mg by mouth        traZODone 150 MG tablet    DESYREL    90 tablet    Take 1 tablet (150 mg) by mouth At Bedtime    Mild episode of recurrent major depressive disorder (H), Insomnia, unspecified type       ZANTAC 150 MG tablet   Generic drug:  ranitidine      Take 150 mg by mouth At Bedtime

## 2018-11-06 ASSESSMENT — ANXIETY QUESTIONNAIRES: GAD7 TOTAL SCORE: 8

## 2018-11-12 ENCOUNTER — RADIANT APPOINTMENT (OUTPATIENT)
Dept: ULTRASOUND IMAGING | Facility: CLINIC | Age: 48
End: 2018-11-12
Attending: NURSE PRACTITIONER
Payer: COMMERCIAL

## 2018-11-12 ENCOUNTER — TELEPHONE (OUTPATIENT)
Dept: FAMILY MEDICINE | Facility: CLINIC | Age: 48
End: 2018-11-12

## 2018-11-12 ENCOUNTER — OFFICE VISIT (OUTPATIENT)
Dept: FAMILY MEDICINE | Facility: CLINIC | Age: 48
End: 2018-11-12
Payer: COMMERCIAL

## 2018-11-12 VITALS
SYSTOLIC BLOOD PRESSURE: 142 MMHG | HEIGHT: 67 IN | TEMPERATURE: 98.3 F | BODY MASS INDEX: 28.56 KG/M2 | DIASTOLIC BLOOD PRESSURE: 90 MMHG | WEIGHT: 182 LBS | RESPIRATION RATE: 18 BRPM | HEART RATE: 72 BPM | OXYGEN SATURATION: 99 %

## 2018-11-12 DIAGNOSIS — R10.9 LEFT FLANK PAIN: ICD-10-CM

## 2018-11-12 DIAGNOSIS — R10.9 LEFT FLANK PAIN: Primary | ICD-10-CM

## 2018-11-12 LAB
ALBUMIN UR-MCNC: NEGATIVE MG/DL
APPEARANCE UR: CLEAR
BILIRUB UR QL STRIP: NEGATIVE
COLOR UR AUTO: YELLOW
GLUCOSE UR STRIP-MCNC: NEGATIVE MG/DL
HGB UR QL STRIP: NEGATIVE
KETONES UR STRIP-MCNC: NEGATIVE MG/DL
LEUKOCYTE ESTERASE UR QL STRIP: NEGATIVE
NITRATE UR QL: NEGATIVE
PH UR STRIP: 6 PH (ref 5–7)
SOURCE: NORMAL
SP GR UR STRIP: 1.02 (ref 1–1.03)
UROBILINOGEN UR STRIP-ACNC: 0.2 EU/DL (ref 0.2–1)

## 2018-11-12 PROCEDURE — 99214 OFFICE O/P EST MOD 30 MIN: CPT | Performed by: NURSE PRACTITIONER

## 2018-11-12 PROCEDURE — 81003 URINALYSIS AUTO W/O SCOPE: CPT | Performed by: NURSE PRACTITIONER

## 2018-11-12 PROCEDURE — 76770 US EXAM ABDO BACK WALL COMP: CPT | Performed by: RADIOLOGY

## 2018-11-12 RX ORDER — TRAMADOL HYDROCHLORIDE 50 MG/1
50 TABLET ORAL EVERY 6 HOURS PRN
Qty: 16 TABLET | Refills: 0 | Status: SHIPPED | OUTPATIENT
Start: 2018-11-12 | End: 2019-01-03

## 2018-11-12 RX ORDER — TAMSULOSIN HYDROCHLORIDE 0.4 MG/1
0.4 CAPSULE ORAL DAILY
Qty: 14 CAPSULE | Refills: 0 | Status: SHIPPED | OUTPATIENT
Start: 2018-11-12 | End: 2019-01-03

## 2018-11-12 ASSESSMENT — PAIN SCALES - GENERAL: PAINLEVEL: EXTREME PAIN (8)

## 2018-11-12 NOTE — MR AVS SNAPSHOT
After Visit Summary   11/12/2018    Edmar ESCOBEDO Lon    MRN: [id number]           Patient Information     Date Of Birth          1970        Visit Information        Provider Department      11/12/2018 1:20 PM Lori Hoff NP [facility]        Today's Diagnoses     Left flank pain    -  1       Follow-ups after your visit        Follow-up notes from your care team     Return in about 1 day (around 11/13/2018), or if symptoms worsen or fail to improve.      Your next 10 appointments already scheduled     Dec 10, 2018  7:00 AM CST   Office Visit with Hortencia Landers MD   [facility] ([facility])    [address] [address] [phone]           Bring a current list of meds and any records pertaining to this visit. For Physicals, please bring immunization records and any forms needing to be filled out. Please arrive 10 minutes early to complete paperwork.            Feb 07, 2019  8:00 AM CST   New Visit with Maciej Harkins MD   [facility] ([facility])    [address] [place] [address] [phone]              Who to contact     If you have questions or need follow up information about today's clinic visit or your schedule please contact [facility] directly at [phone].  Normal or non-critical lab and imaging results will be communicated to you by MyChart, letter or phone within 4 business days after the clinic has received the results. If you do not hear from us within 7 days, please contact the clinic through MyChart or phone. If you have a critical or abnormal lab result, we will notify you by phone as soon as possible.  Submit refill requests through [employer]oom[employer]et or call your pharmacy and they will forward the refill request to us. Please allow 3 business days for your refill to be completed.          Additional Information About  "Your Visit        MyChart Information     [employer] Education lets you send messages to your doctor, view your test results, renew your prescriptions, schedule appointments and more. To sign up, go to [email or web address] . Click on \"Log in\" on the left side of the screen, which will take you to the Welcome page. Then click on \"Sign up Now\" on the right side of the page.     You will be asked to enter the access code listed below, as well as some personal information. Please follow the directions to create your username and password.     Your access code is: [email or web address]  Expires: 2/3/2019  7:54 AM     Your access code will  in 90 days. If you need help or a new code, please call your [facility] clinic or [phone].        Care EveryWhere ID     This is your Care EveryWhere ID. This could be used by other organizations to access your [facility] medical records  [id number]        Your Vitals Were     Pulse Temperature Respirations Height Pulse Oximetry BMI (Body Mass Index)    72 98.3  F (36.8  C) (Oral) 18 1.702 m (5' 7\") 99% 28.51 kg/m2       Blood Pressure from Last 3 Encounters:   18 142/90   18 123/75   18 130/72    Weight from Last 3 Encounters:   18 82.6 kg (182 lb)   18 81.6 kg (180 lb)   18 79.8 kg (176 lb)              We Performed the Following     *UA reflex to Microscopic and Culture ([facility] and [facility] (except [place] and [place])          Today's Medication Changes          These changes are accurate as of 18  3:28 PM.  If you have any questions, ask your nurse or doctor.               Start taking these medicines.        Dose/Directions    tamsulosin 0.4 MG capsule   Commonly known as:  FLOMAX   Used for:  Left flank pain   Started by:  Lori Hoff NP        Dose:  0.4 mg   Take 1 capsule (0.4 mg) by mouth daily   Quantity:  14 capsule   Refills:  0       traMADol 50 MG tablet   Commonly known as:  ULTRAM   Used for:  Left flank pain   Started by:  " Lori Hoff NP        Dose:  50 mg   Take 1 tablet (50 mg) by mouth every 6 hours as needed for severe pain   Quantity:  16 tablet   Refills:  0            Where to get your medicines      These medications were [facility] to [id number] - [place], [place] - [address] [address]  [address] [address] [address]     Phone:  [phone]     tamsulosin 0.4 MG capsule         Some of these will need a paper prescription and others can be bought over the counter.  Ask your nurse if you have questions.     Bring a paper prescription for each of these medications     traMADol 50 MG tablet               Information about OPIOIDS     PRESCRIPTION OPIOIDS: WHAT YOU NEED TO KNOW   We gave you an opioid (narcotic) pain medicine. It is important to manage your pain, but opioids are not always the best choice. You should first try all the other options your care team gave you. Take this medicine for as short a time (and as few doses) as possible.    Some activities can increase your pain, such as bandage changes or therapy sessions. It may help to take your pain medicine 30 to 60 minutes before these activities. Reduce your stress by getting enough sleep, working on hobbies you enjoy and practicing relaxation or meditation. Talk to your care team about ways to manage your pain beyond prescription opioids.    These medicines have risks:    DO NOT drive when on new or higher doses of pain medicine. These medicines can affect your alertness and reaction times, and you could be arrested for driving under the influence (DUI). If you need to use opioids long-term, talk to your care team about driving.    DO NOT operate heavy machinery    DO NOT do any other dangerous activities while taking these medicines.    DO NOT drink any alcohol while taking these medicines.     If the opioid prescribed includes acetaminophen, DO NOT take with any other medicines that contain acetaminophen. Read all labels carefully.  Look for the word  acetaminophen  or  Tylenol.  Ask your pharmacist if you have questions or are unsure.    You can get addicted to pain medicines, especially if you have a history of addiction (chemical, alcohol or substance dependence). Talk to your care team about ways to reduce this risk.    All opioids tend to cause constipation. Drink plenty of water and eat foods that have a lot of fiber, such as fruits, vegetables, prune juice, apple juice and high-fiber cereal. Take a laxative (Miralax, milk of magnesia, Colace, Senna) if you don t move your bowels at least every other day. Other side effects include upset stomach, sleepiness, dizziness, throwing up, tolerance (needing more of the medicine to have the same effect), physical dependence and slowed breathing.    Store your pills in a secure place, locked if possible. We will not replace any lost or stolen medicine. If you don t finish your medicine, please throw away (dispose) as directed by your pharmacist. The [place] Pollution Control Agency has more information about safe disposal: [email or web address]/living-[email or web address]/[email or web address]-medications         Primary Care Provider Office Phone # Fax #    [facility] [phone] [address] [address]        Equal Access to Services     MAO HERNANDEZ : Willa colmenareso Socheikhali, waaxda luqadaha, qaybta kaalmada adeegyada, addie ríos. So [facility] [phone].    ATENCIÓN: Si habla español, tiene a capps disposición servicios gratuitos de asistencia lingüística. Llame al [phone].    We comply with applicable federal civil rights laws and [place] laws. We do not discriminate on the basis of race, color, national origin, age, disability, sex, sexual orientation, or gender identity.            Thank you!     Thank you for choosing [facility]  for your care. Our goal is always to provide you with excellent  care. Hearing back from our patients is one way we can continue to improve our services. Please take a few minutes to complete the written survey that you may receive in the mail after your visit with us. Thank you!             Your Updated Medication List - Protect others around you: Learn how to safely use, store and throw away your medicines at www.disposemymeds.org.          This list is accurate as of 11/12/18  3:28 PM.  Always use your most recent med list.                   Brand Name Dispense Instructions for use Diagnosis    buPROPion 150 MG 24 hr tablet    WELLBUTRIN XL    90 tablet    Take 1 tablet (150 mg) by mouth every morning    Mild episode of recurrent major depressive disorder (H)       busPIRone 15 MG tablet    BUSPAR    360 tablet    TWO TABLETS IN THE MORNING AND 1.5 TABLETS IN THE AFTERNOON. iF NEEDED, INCREASE TO TWO TABLETS TWICE DAILY AFTER TWO WEEKS    Anxiety       PRILOSEC PO      Take 20 mg by mouth        tamsulosin 0.4 MG capsule    FLOMAX    14 capsule    Take 1 capsule (0.4 mg) by mouth daily    Left flank pain       traMADol 50 MG tablet    ULTRAM    16 tablet    Take 1 tablet (50 mg) by mouth every 6 hours as needed for severe pain    Left flank pain       traZODone 150 MG tablet    DESYREL    90 tablet    Take 1 tablet (150 mg) by mouth At Bedtime    Mild episode of recurrent major depressive disorder (H), Insomnia, unspecified type       ZANTAC 150 MG tablet   Generic drug:  ranitidine      Take 150 mg by mouth At Bedtime

## 2018-11-12 NOTE — PROGRESS NOTES
SUBJECTIVE:   Edmar Forrest is a 48 year old male who presents to clinic today for the following health issues:      Concern - LT side lower back/quadrant  Onset: last night    Description:   A sharp pain that comes in waves    Intensity: severe    Progression of Symptoms:  worsening    Accompanying Signs & Symptoms:  Mouth dry    Previous history of similar problem:   nothing    Precipitating factors:   Worsened by: unsure    Alleviating factors:  Improved by: nothing    Therapies Tried and outcome: nothing    Pain started last night.  Woke up this morning and is still there. Got to work, drove there. Walked to desk, pain got worse, then called and came to clinic. 810 pain.   Denies dysuria. Denies hematuria.  Pain moves into left groin at times. Family hx of kidney stones.  Fell onto buttocks on Friday, and it caused pain but feels this is much different.  No fever/chills. Bowels normal.     US done at 2:10 pm.  See phone encounter.        Problem list and histories reviewed & adjusted, as indicated.  Additional history: as documented    Patient Active Problem List   Diagnosis     History of alcohol dependence (H)     Mild episode of recurrent major depressive disorder (H)     Anxiety     Lipoma of skin and subcutaneous tissue     Insomnia, unspecified type     Past Surgical History:   Procedure Laterality Date     ARTHROSCOPY KNEE RT/LT         Social History   Substance Use Topics     Smoking status: Never Smoker     Smokeless tobacco: Never Used     Alcohol use No     Family History   Problem Relation Age of Onset     Breast Cancer Mother 70     Hypertension Mother      HEART DISEASE Father      ? rhythm     Arthritis Father      Nephrolithiasis Father      Nephrolithiasis Sister      Chronic Obstructive Pulmonary Disease Brother       age 50 due to complications from copd. heavy smoker     Alcoholism Brother          Current Outpatient Prescriptions   Medication Sig Dispense Refill     buPROPion  "(WELLBUTRIN XL) 150 MG 24 hr tablet Take 1 tablet (150 mg) by mouth every morning 90 tablet 1     busPIRone (BUSPAR) 15 MG tablet TWO TABLETS IN THE MORNING AND 1.5 TABLETS IN THE AFTERNOON. iF NEEDED, INCREASE TO TWO TABLETS TWICE DAILY AFTER TWO WEEKS 360 tablet 1     Omeprazole (PRILOSEC PO) Take 20 mg by mouth       ranitidine (ZANTAC) 150 MG tablet Take 150 mg by mouth At Bedtime       tamsulosin (FLOMAX) 0.4 MG capsule Take 1 capsule (0.4 mg) by mouth daily 14 capsule 0     traMADol (ULTRAM) 50 MG tablet Take 1 tablet (50 mg) by mouth every 6 hours as needed for severe pain 16 tablet 0     traZODone (DESYREL) 150 MG tablet Take 1 tablet (150 mg) by mouth At Bedtime 90 tablet 1     No Known Allergies    Reviewed and updated as needed this visit by clinical staff  Tobacco  Allergies  Meds  Problems  Med Hx  Surg Hx  Fam Hx  Soc Hx        Reviewed and updated as needed this visit by Provider  Allergies  Meds  Problems         ROS:  Constitutional, cardiovascular, pulmonary, MS as above, gi and gu systems are negative, except as otherwise noted.    OBJECTIVE:     /90 (BP Location: Right arm, Patient Position: Standing, Cuff Size: Adult Regular)  Pulse 72  Temp 98.3  F (36.8  C) (Oral)  Resp 18  Ht 1.702 m (5' 7\")  Wt 82.6 kg (182 lb)  SpO2 99%  BMI 28.51 kg/m2  Body mass index is 28.51 kg/(m^2).  GENERAL: pacing room, alert and has significant pain  RESP: lungs clear to auscultation - no rales, rhonchi or wheezes  CV: regular rate and rhythm, normal S1 S2, no S3 or S4, no murmur, click or rub  ABDOMEN: soft, tender in left lower quadrant, no hepatosplenomegaly, no masses and bowel sounds normal  MS: no gross musculoskeletal defects noted but significant pain to light palpation to left flank  SKIN: no suspicious lesions or rashes noted on left flank    Diagnostic Test Results:  Results for orders placed or performed in visit on 11/12/18 (from the past 24 hour(s))   *UA reflex to Microscopic " and Culture ([place] [facility] (except [place] and [place])   Result Value Ref Range    Color Urine Yellow     Appearance Urine Clear     Glucose Urine Negative NEG^Negative mg/dL    Bilirubin Urine Negative NEG^Negative    Ketones Urine Negative NEG^Negative mg/dL    Specific Gravity Urine 1.025 1.003 - 1.035    Blood Urine Negative NEG^Negative    pH Urine 6.0 5.0 - 7.0 pH    Protein Albumin Urine Negative NEG^Negative mg/dL    Urobilinogen Urine 0.2 0.2 - 1.0 EU/dL    Nitrite Urine Negative NEG^Negative    Leukocyte Esterase Urine Negative NEG^Negative    Source Midstream Urine        ASSESSMENT/PLAN:       1. Left flank pain  Urine is normal.  Ultrasound normal.  Trial tramadol for pain.  Does not need to start the Flomax as this does not appear to be kidney stone related.  Has normal bowels.  No rash on skin, may need to consider shingles if rash appears.  He did fall on his buttocks on Friday.  May need to consider x-ray of lower spine and/or CT of abdomen pelvis if pain continues to be severe.  He will go to the emergency room if pain is significantly worse overnight.  May also need to consider lab workup for infection or inflammatory markers.  - *UA reflex to Microscopic and Culture ([place] [facility] (except [place] and [place])  - US Renal Complete; Future  - traMADol (ULTRAM) 50 MG tablet; Take 1 tablet (50 mg) by mouth every 6 hours as needed for severe pain  Dispense: 16 tablet; Refill: 0  - tamsulosin (FLOMAX) 0.4 MG capsule; Take 1 capsule (0.4 mg) by mouth daily  Dispense: 14 capsule; Refill: 0    FUTURE APPOINTMENTS:       - Follow-up visit in 24 hours if not improving    YOU Sandhu, NP-C  [facility]

## 2018-11-12 NOTE — TELEPHONE ENCOUNTER
Called patient about his normal renal ultrasound results.  He also has normal urinalysis.  Nothing to explain his severe left flank pain.  Advised if things increasingly worsen, and the tramadol does not help his pain, advised to go to ER.  If things are about the same or not improving tomorrow morning, he will return for further evaluation.  May need to consider x-ray, and/or CT of abdomen and pelvis.  And/or lab workup.  May also need to consider shingles if the rash appears on the left flank.  Patient verbalized understanding.  YOU Sandhu, NP-C  [facility]

## 2018-11-13 ENCOUNTER — TELEPHONE (OUTPATIENT)
Dept: FAMILY MEDICINE | Facility: CLINIC | Age: 48
End: 2018-11-13

## 2018-11-13 DIAGNOSIS — R10.9 ACUTE LEFT FLANK PAIN: Primary | ICD-10-CM

## 2018-11-13 NOTE — TELEPHONE ENCOUNTER
Reason for Call:  Other     Detailed comments: Patient was seen on 11/12/2018 and he states he still has side pain and it was discussed that he may need x-ray for side pain and asking if he could have this done?    Phone Number Patient can be reached at: Home number on file [phone] (home)    Best Time: any    Can we leave a detailed message on this number? YES    Call taken on 11/13/2018 at 8:40 AM by Kourtney Laguna

## 2018-11-13 NOTE — TELEPHONE ENCOUNTER
X-ray ordered. He can make a lab appointment to have this done today. Let provider know what time he will be in so NP can review image right away. Also ordered CBC and CRP, looking for signs of inflammation and/or infection. He should get those labs done also when he comes in.   YOU Sandhu, NP-C  [facility]

## 2018-11-13 NOTE — TELEPHONE ENCOUNTER
Reason for Call:  Other     Detailed comments: Patient calling stating that he is feeling better and not going to come in at 1:20 pm today.    Phone Number Patient can be reached at: Home number on file [phone] (home)    Best Time: any    Can we leave a detailed message on this number? Not Applicable    Call taken on 11/13/2018 at 12:21 PM by Kourtney Laguna

## 2018-12-10 ENCOUNTER — OFFICE VISIT (OUTPATIENT)
Dept: FAMILY MEDICINE | Facility: CLINIC | Age: 48
End: 2018-12-10
Payer: COMMERCIAL

## 2018-12-10 VITALS
RESPIRATION RATE: 18 BRPM | OXYGEN SATURATION: 97 % | BODY MASS INDEX: 28.25 KG/M2 | SYSTOLIC BLOOD PRESSURE: 127 MMHG | HEART RATE: 66 BPM | HEIGHT: 67 IN | DIASTOLIC BLOOD PRESSURE: 82 MMHG | TEMPERATURE: 98.3 F | WEIGHT: 180 LBS

## 2018-12-10 DIAGNOSIS — M54.50 LEFT-SIDED LOW BACK PAIN WITHOUT SCIATICA, UNSPECIFIED CHRONICITY: ICD-10-CM

## 2018-12-10 DIAGNOSIS — R09.81 NASAL CONGESTION: ICD-10-CM

## 2018-12-10 DIAGNOSIS — F10.21 HISTORY OF ALCOHOL DEPENDENCE (H): ICD-10-CM

## 2018-12-10 DIAGNOSIS — F41.9 ANXIETY: Primary | ICD-10-CM

## 2018-12-10 DIAGNOSIS — F33.0 MILD EPISODE OF RECURRENT MAJOR DEPRESSIVE DISORDER (H): ICD-10-CM

## 2018-12-10 DIAGNOSIS — G47.00 INSOMNIA, UNSPECIFIED TYPE: ICD-10-CM

## 2018-12-10 PROCEDURE — 99214 OFFICE O/P EST MOD 30 MIN: CPT | Performed by: FAMILY MEDICINE

## 2018-12-10 RX ORDER — BUPROPION HYDROCHLORIDE 300 MG/1
300 TABLET ORAL EVERY MORNING
Qty: 30 TABLET | Refills: 1 | Status: SHIPPED | OUTPATIENT
Start: 2018-12-10 | End: 2019-01-03

## 2018-12-10 RX ORDER — FLUTICASONE PROPIONATE 50 MCG
1 SPRAY, SUSPENSION (ML) NASAL DAILY
Qty: 1 BOTTLE | Refills: 11 | Status: SHIPPED | OUTPATIENT
Start: 2018-12-10 | End: 2019-12-10

## 2018-12-10 RX ORDER — CYCLOBENZAPRINE HCL 10 MG
10 TABLET ORAL 3 TIMES DAILY PRN
Qty: 60 TABLET | Refills: 1 | Status: SHIPPED | OUTPATIENT
Start: 2018-12-10 | End: 2018-12-20

## 2018-12-10 ASSESSMENT — MIFFLIN-ST. JEOR: SCORE: 1640.1

## 2018-12-10 ASSESSMENT — ANXIETY QUESTIONNAIRES
GAD7 TOTAL SCORE: 3
2. NOT BEING ABLE TO STOP OR CONTROL WORRYING: SEVERAL DAYS
7. FEELING AFRAID AS IF SOMETHING AWFUL MIGHT HAPPEN: NOT AT ALL
3. WORRYING TOO MUCH ABOUT DIFFERENT THINGS: SEVERAL DAYS
1. FEELING NERVOUS, ANXIOUS, OR ON EDGE: SEVERAL DAYS
IF YOU CHECKED OFF ANY PROBLEMS ON THIS QUESTIONNAIRE, HOW DIFFICULT HAVE THESE PROBLEMS MADE IT FOR YOU TO DO YOUR WORK, TAKE CARE OF THINGS AT HOME, OR GET ALONG WITH OTHER PEOPLE: SOMEWHAT DIFFICULT
5. BEING SO RESTLESS THAT IT IS HARD TO SIT STILL: NOT AT ALL
6. BECOMING EASILY ANNOYED OR IRRITABLE: NOT AT ALL

## 2018-12-10 ASSESSMENT — PATIENT HEALTH QUESTIONNAIRE - PHQ9
5. POOR APPETITE OR OVEREATING: NOT AT ALL
SUM OF ALL RESPONSES TO PHQ QUESTIONS 1-9: 6

## 2018-12-10 ASSESSMENT — PAIN SCALES - GENERAL: PAINLEVEL: SEVERE PAIN (6)

## 2018-12-10 NOTE — PATIENT INSTRUCTIONS
Take 3-10 mg of melatonin in addition to trazodone.     To help with your sinus pressure, I recommend a saline spray and Flonase. Use your right hand to spray the Flonase in your left nostril and vice versa. A humidifier in your bedroom can also help.     Check in in 3-4 weeks to see how things are going. We can do a phone visit for this.    Sometimes insomnia can be made worse by our own habits or situation.  You should consider making some of the following changes to help improve your sleep:     If there is excessive noise in your house / neighborhood use a fan or similar device to make a quiet background noise that can drown out other noises    If your room is too bright early in the morning, hang a blanket or extra curtain over the windows to keep the light out or use a sleeping mask to cover your eyes    If your room is too warm during the night, set the temperature in the house down a few degrees for the night    Spend a little time before bedtime trying to relax.  Don t work right up until bedtime.  Take 30-60 minutes to relax and unwind before bedtime with a book or watching TV    Do not drink anything with alcohol or caffeine in them for at least 4-5 hours before bedtime    Do not smoke right before bedtime or during the night    No strenuous exercise for 2-3 hours before bedtime  Insomnia - Stimulus Control  When someone lays awake in bed over many nights, your body can actually learn to be awake in bed, mainly because that is what has happened so many times.  Your body has actually been  conditioned  or trained to be awake during the night because it has happened so often.  To break this habit you should try to follow these steps to improve your insomnia:  Set a strict bedtime and rise time to keep every day of the week, including weekends  Go to bed at the set time, but only if you are sleepy (not tired or fatigued but drowsy)  Don t lay in bed for more than 15-30 minutes if you can t sleep.  Get up and  go do something relaxing like reading or watching TV until you get drowsy again   Get up at the same time every day regardless of how much sleep you get  Use the bedroom only for sleep and sex.  Do NOT watch TV, read, use the computer, play on your cell phone or do work while in bed    Do not take naps during the daytime and avoid any situations where you might get drowsy or fall asleep unintentionally especially in the evening.

## 2018-12-10 NOTE — PROGRESS NOTES
SUBJECTIVE:   Edmar Forrest is a 48 year old male who presents to clinic today for the following health issues:    Depression and Anxiety Follow-Up    Status since last visit: No change    Other associated symptoms:not sleeping and feeling drowsy    Complicating factors:     Significant life event: No     Current substance abuse: None    -Patient reports significant stress from his new job, but feels this stress is a normal response to his job.  -Patient feels the increased Buspar is helping because even though his job got more stressful his anxiety is not worse  -Support system at work and with family   -With increased stress patient continues to feel his urge to drink alcohol is manageable and he does not want to go back to that     Sleep:  -Complains of increased difficulty falling asleep and staying asleep over the last month, correlating with increased stress. He feels he cannot turn his thoughts off. Averaging 3-4 hours of sleep nightly. Patient reports feeling like he will almost fall asleep and then wake back up with head pressure and tinnitus. This is not new for him. If patient takes more trazodone to sleep better, he feels terrible the next morning. Patient takes bupropion first thing in the morning and trazodone at 9pm, right before going to bed  -Has not tried other sleep aids besides trazodone other than OTC tylenol PM. Believes he tried melatonin years ago but cannot remember   -Patient has not been able to exercise as frequently since his increased work stress and feels this is also negatively affecting his sleep, as he sleeps better when he exercises  -Stops caffeine around 1pm, patient drinks one energy drink daily prior to this  -Patient feels he cannot fall asleep if he just goes to bed, he tries to fall asleep while watching TV and sleeps for a few hours, then gets up and goes to bed after that.     Additional:  -Complains of low back pain. Was seen recently for flank pain and kidney stones  ruled out. Has no radicular pain/numbness/tingling. Hx of low back pain and he does exercises for this. Reports stands all day for work  -Experiences sinus pressure every morning. Patient reports he had allergies as a child and had allergy shots, but this improved when he moved from the [place] the [place]. He is not using an allergy medicines because he does not like them     PHQ 5/3/2018 2018   PHQ-9 Total Score 0 0   Q9: Suicide Ideation Not at all Not at all     MANOHAR-7 SCORE 5/3/2018 2018   Total Score 3 8       PHQ-9  English  PHQ-9   Any Language  MANOHAR-7  Suicide Assessment Five-step Evaluation and Treatment (SAFE-T)      Amount of exercise or physical activity: Occasionally , busy with work right now    Problems taking medications regularly: No    Medication side effects: no    Diet: regular (no restrictions)            Problem list and histories reviewed & adjusted, as indicated.  Additional history: as documented    Patient Active Problem List   Diagnosis     History of alcohol dependence (H)     Mild episode of recurrent major depressive disorder (H)     Anxiety     Lipoma of skin and subcutaneous tissue     Insomnia, unspecified type     Past Surgical History:   Procedure Laterality Date     ARTHROSCOPY KNEE RT/LT         Social History     Tobacco Use     Smoking status: Never Smoker     Smokeless tobacco: Never Used   Substance Use Topics     Alcohol use: No     Family History   Problem Relation Age of Onset     Breast Cancer Mother 70     Hypertension Mother      Heart Disease Father         ? rhythm     Arthritis Father      Nephrolithiasis Father      Nephrolithiasis Sister      Chronic Obstructive Pulmonary Disease Brother          age 50 due to complications from copd. heavy smoker     Alcoholism Brother            Reviewed and updated as needed this visit by clinical staff  Tobacco  Allergies  Meds  Med Hx  Surg Hx  Fam Hx  Soc Hx      Reviewed and  "updated as needed this visit by Provider         ROS:  Constitutional, HEENT, cardiovascular, pulmonary, gi and gu systems are negative, except as otherwise noted.    This document serves as a record of the services and decisions personally performed by KAYLIE MCCORD. It was created on his/her behalf by Maya Dumont, a trained medical scribe. The creation of this document is based on the provider's statements to the medical scribe. Maya Dumont, December 10, 2018 7:15 AM  OBJECTIVE:     /82 (BP Location: Right arm, Patient Position: Chair, Cuff Size: Adult Large)   Pulse 66   Temp 98.3  F (36.8  C) (Oral)   Resp 18   Ht 1.702 m (5' 7\")   Wt 81.6 kg (180 lb)   SpO2 97%   BMI 28.19 kg/m    Body mass index is 28.19 kg/m .  GENERAL: healthy, alert and no distress  HENT: nasal mucosal edema, ear canals and TM's normal, nose and mouth without ulcers or lesions  BacK; no spinal tenderness. Left paraspinal tenderness  MS: no gross musculoskeletal defects noted, no edema  PSYCH: mentation appears normal, affect normal/bright    ASSESSMENT/PLAN:   1. Anxiety  2. Mild episode of recurrent major depressive disorder (H)  Increase Wellbutrin for better control. Check-in in 3-4 weeks  - buPROPion (WELLBUTRIN XL) 300 MG 24 hr tablet; Take 1 tablet (300 mg) by mouth every morning  Dispense: 30 tablet; Refill: 1    3. Insomnia, unspecified type  Declined referral to sleep counseling or sleep medicine. Continue trazodone but add melatonin trial. See patient instructions regarding sleep hygiene and melatonin. Would like to stay away from benzos/ambien given his etoh hx, but ambien could be next option.     5. History of alcohol dependence (H)  sober    6. Nasal congestion  - fluticasone (FLONASE) 50 MCG/ACT nasal spray; Spray 1 spray into both nostrils daily  Dispense: 1 Bottle; Refill: 11    7. Left-sided low back pain without sciatica, unspecified chronicity  Reviewed possible interaction between flexeril " and other sedative medication. F/u for more formal eval if ongoing/worsening sx's as this was not the focus of the appt today  - cyclobenzaprine (FLEXERIL) 10 MG tablet; Take 1 tablet (10 mg) by mouth 3 times daily as needed for muscle spasms  Dispense: 60 tablet; Refill: 1    Patient Instructions   Take 3-10 mg of melatonin in addition to trazodone.     To help with your sinus pressure, I recommend a saline spray and Flonase. Use your right hand to spray the Flonase in your left nostril and vice versa. A humidifier in your bedroom can also help.     Check in in 3-4 weeks to see how things are going. We can do a phone visit for this.    Sometimes insomnia can be made worse by our own habits or situation.  You should consider making some of the following changes to help improve your sleep:     If there is excessive noise in your house / neighborhood use a fan or similar device to make a quiet background noise that can drown out other noises    If your room is too bright early in the morning, hang a blanket or extra curtain over the windows to keep the light out or use a sleeping mask to cover your eyes    If your room is too warm during the night, set the temperature in the house down a few degrees for the night    Spend a little time before bedtime trying to relax.  Don t work right up until bedtime.  Take 30-60 minutes to relax and unwind before bedtime with a book or watching TV    Do not drink anything with alcohol or caffeine in them for at least 4-5 hours before bedtime    Do not smoke right before bedtime or during the night    No strenuous exercise for 2-3 hours before bedtime  Insomnia - Stimulus Control  When someone lays awake in bed over many nights, your body can actually learn to be awake in bed, mainly because that is what has happened so many times.  Your body has actually been  conditioned  or trained to be awake during the night because it has happened so often.  To break this habit you should try  to follow these steps to improve your insomnia:  Set a strict bedtime and rise time to keep every day of the week, including weekends  Go to bed at the set time, but only if you are sleepy (not tired or fatigued but drowsy)  Don t lay in bed for more than 15-30 minutes if you can t sleep.  Get up and go do something relaxing like reading or watching TV until you get drowsy again   Get up at the same time every day regardless of how much sleep you get  Use the bedroom only for sleep and sex.  Do NOT watch TV, read, use the computer, play on your cell phone or do work while in bed    Do not take naps during the daytime and avoid any situations where you might get drowsy or fall asleep unintentionally especially in the evening.               The information in this document, created by the medical scribe for me, accurately reflects the services I personally performed and the decisions made by me. I have reviewed and approved this document for accuracy.   Hortencia Landers MD  [facility]

## 2018-12-11 ASSESSMENT — ANXIETY QUESTIONNAIRES: GAD7 TOTAL SCORE: 3

## 2019-01-03 ENCOUNTER — VIRTUAL VISIT (OUTPATIENT)
Dept: FAMILY MEDICINE | Facility: CLINIC | Age: 49
End: 2019-01-03

## 2019-01-03 DIAGNOSIS — F41.9 ANXIETY: Primary | ICD-10-CM

## 2019-01-03 DIAGNOSIS — F33.0 MILD EPISODE OF RECURRENT MAJOR DEPRESSIVE DISORDER (H): ICD-10-CM

## 2019-01-03 PROCEDURE — 99441 ZZC PHYSICIAN TELEPHONE EVALUATION 5-10 MIN: CPT | Performed by: FAMILY MEDICINE

## 2019-01-03 RX ORDER — BUPROPION HYDROCHLORIDE 150 MG/1
150 TABLET ORAL EVERY MORNING
Qty: 90 TABLET | Refills: 1 | Status: SHIPPED | OUTPATIENT
Start: 2019-01-03 | End: 2019-04-10

## 2019-01-03 ASSESSMENT — PATIENT HEALTH QUESTIONNAIRE - PHQ9
5. POOR APPETITE OR OVEREATING: NOT AT ALL
SUM OF ALL RESPONSES TO PHQ QUESTIONS 1-9: 0

## 2019-01-03 ASSESSMENT — ANXIETY QUESTIONNAIRES
6. BECOMING EASILY ANNOYED OR IRRITABLE: NOT AT ALL
7. FEELING AFRAID AS IF SOMETHING AWFUL MIGHT HAPPEN: NOT AT ALL
IF YOU CHECKED OFF ANY PROBLEMS ON THIS QUESTIONNAIRE, HOW DIFFICULT HAVE THESE PROBLEMS MADE IT FOR YOU TO DO YOUR WORK, TAKE CARE OF THINGS AT HOME, OR GET ALONG WITH OTHER PEOPLE: NOT DIFFICULT AT ALL
1. FEELING NERVOUS, ANXIOUS, OR ON EDGE: SEVERAL DAYS
GAD7 TOTAL SCORE: 3
3. WORRYING TOO MUCH ABOUT DIFFERENT THINGS: SEVERAL DAYS
2. NOT BEING ABLE TO STOP OR CONTROL WORRYING: SEVERAL DAYS
5. BEING SO RESTLESS THAT IT IS HARD TO SIT STILL: NOT AT ALL

## 2019-01-04 ASSESSMENT — ANXIETY QUESTIONNAIRES: GAD7 TOTAL SCORE: 3

## 2019-02-11 NOTE — PROGRESS NOTES

## 2019-02-21 ENCOUNTER — OFFICE VISIT (OUTPATIENT)
Dept: GASTROENTEROLOGY | Facility: CLINIC | Age: 49
End: 2019-02-21
Payer: COMMERCIAL

## 2019-02-21 ENCOUNTER — TELEPHONE (OUTPATIENT)
Dept: FAMILY MEDICINE | Facility: CLINIC | Age: 49
End: 2019-02-21

## 2019-02-21 VITALS
BODY MASS INDEX: 28.61 KG/M2 | HEART RATE: 83 BPM | DIASTOLIC BLOOD PRESSURE: 90 MMHG | WEIGHT: 182.3 LBS | SYSTOLIC BLOOD PRESSURE: 142 MMHG | HEIGHT: 67 IN | OXYGEN SATURATION: 99 %

## 2019-02-21 DIAGNOSIS — R13.10 DYSPHAGIA, UNSPECIFIED TYPE: Primary | ICD-10-CM

## 2019-02-21 PROCEDURE — 99204 OFFICE O/P NEW MOD 45 MIN: CPT | Performed by: INTERNAL MEDICINE

## 2019-02-21 ASSESSMENT — MIFFLIN-ST. JEOR: SCORE: 1650.54

## 2019-02-21 ASSESSMENT — PAIN SCALES - GENERAL: PAINLEVEL: NO PAIN (0)

## 2019-02-21 NOTE — NURSING NOTE
"Edmar Forrest's goals for this visit include:   Chief Complaint   Patient presents with     Consult     Difficulty swallowing       He requests these members of his care team be copied on today's visit information: yes    PCP: [facility]    Referring Provider:  Maciej Harkins MD  [facility]  [id number] [place] [address]    /90   Pulse 83   Ht 1.702 m (5' 7\")   Wt 82.7 kg (182 lb 4.8 oz)   SpO2 99%   BMI 28.55 kg/m      Do you need any medication refills at today's visit? No    Mica Vanegas CMA      "

## 2019-02-21 NOTE — TELEPHONE ENCOUNTER
Patient is approved for Endoscopy  Dr Pelayo from [facility] is asking you to put an addendum in patient referral clearing him for anesthesia for his procedure    Call patient if any questions  He is going in 2/27 for procedure  [phone]

## 2019-02-22 NOTE — PROGRESS NOTES
GASTROENTEROLOGY NEW PATIENT CLINIC VISIT    CC/REFERRING MD:    Clinic, [facility]  Hortencia Landers MD    REASON FOR CONSULTATION:   Maciej Lainez* for   Chief Complaint   Patient presents with     Consult     Difficulty swallowing       HISTORY OF PRESENT ILLNESS:    Edmar Forrest is 48 year old male who presents for evaluation of dysphagia.  He notes that he has a 6-month history of progressive dysphagia to solids and pills.  He feels like he has food become impacted in his esophagus and eventually he is able to retch it back up.  He does not have any problems with liquids.  He is not having any hematemesis.  He has food allergies including melons and bananas.  He does get frequent symptoms of GERD and takes Prilosec and Zantac.  He gets much more severe GERD if he does not take these.  He reports a history in 2012 of vomiting leading to a life-threatening esophageal hemorrhage.  This occurred in [place] and he reports that he spent several days in the hospital and he may have even had a cardiac arrest related to blood loss.  He reports he had an EGD at that time though no Endo-therapy was needed and he did not receive esophageal surgery.  He has not had any recurrence of similar episodes.  He notes he does take Motrin 2-3 days/week.  History notable for grandfather with esophageal cancer.      PROBLEM LIST  Patient Active Problem List    Diagnosis Date Noted     Insomnia, unspecified type 05/04/2018     Priority: Medium     History of alcohol dependence (H) 05/03/2018     Priority: Medium     Sober since 2014. AA in past, but didn't find helpful        Mild episode of recurrent major depressive disorder (H) 05/03/2018     Priority: Medium     Anxiety 05/03/2018     Priority: Medium     Lipoma of skin and subcutaneous tissue 05/03/2018     Priority: Medium       PERTINENT PAST MEDICAL HISTORY:  (I personally reviewed this history with the patient at today's visit)   Past  Medical History:   Diagnosis Date     Anemia 2012    secondary to esophageal perforation     Cardiac arrest (H) 2012    due to esophageal perforation     Esophageal perforation 2012    due to excessive vomiting         PREVIOUS SURGERIES: (I personally reviewed this history with the patient at today's visit)   Past Surgical History:   Procedure Laterality Date     ARTHROSCOPY KNEE RT/LT  1989         ALLERGIES:   No Known Allergies    PERTINENT MEDICATIONS:    Current Outpatient Medications:      buPROPion (WELLBUTRIN XL) 150 MG 24 hr tablet, Take 1 tablet (150 mg) by mouth every morning, Disp: 90 tablet, Rfl: 1     busPIRone (BUSPAR) 15 MG tablet, TWO TABLETS IN THE MORNING AND 1.5 TABLETS IN THE AFTERNOON. iF NEEDED, INCREASE TO TWO TABLETS TWICE DAILY AFTER TWO WEEKS, Disp: 360 tablet, Rfl: 1     fluticasone (FLONASE) 50 MCG/ACT nasal spray, Spray 1 spray into both nostrils daily, Disp: 1 Bottle, Rfl: 11     Omeprazole (PRILOSEC PO), Take 20 mg by mouth, Disp: , Rfl:      ranitidine (ZANTAC) 150 MG tablet, Take 150 mg by mouth At Bedtime, Disp: , Rfl:      traZODone (DESYREL) 150 MG tablet, Take 1 tablet (150 mg) by mouth At Bedtime, Disp: 90 tablet, Rfl: 1    SOCIAL HISTORY:  Social History     Socioeconomic History     Marital status:      Spouse name: Not on file     Number of children: Not on file     Years of education: Not on file     Highest education level: Not on file   Occupational History     Not on file   Social Needs     Financial resource strain: Not on file     Food insecurity:     Worry: Not on file     Inability: Not on file     Transportation needs:     Medical: Not on file     Non-medical: Not on file   Tobacco Use     Smoking status: Never Smoker     Smokeless tobacco: Never Used   Substance and Sexual Activity     Alcohol use: No     Drug use: No     Comment: recreational use in 20's/30s- No IV drug use. cocaine, THC, meth. Has also used LSD, ectacy     Sexual activity: Yes      "Partners: Female     Birth control/protection: OCP   Lifestyle     Physical activity:     Days per week: Not on file     Minutes per session: Not on file     Stress: Not on file   Relationships     Social connections:     Talks on phone: Not on file     Gets together: Not on file     Attends Scientologist service: Not on file     Active member of club or organization: Not on file     Attends meetings of clubs or organizations: Not on file     Relationship status: Not on file     Intimate partner violence:     Fear of current or ex partner: Not on file     Emotionally abused: Not on file     Physically abused: Not on file     Forced sexual activity: Not on file   Other Topics Concern     Parent/sibling w/ CABG, MI or angioplasty before 65F 55M? Not Asked   Social History Narrative     Not on file       FAMILY HISTORY: (I personally reviewed this history with the patient at today's visit)  Family History   Problem Relation Age of Onset     Breast Cancer Mother 70     Hypertension Mother      Heart Disease Father         ? rhythm     Arthritis Father      Nephrolithiasis Father      Nephrolithiasis Sister      Chronic Obstructive Pulmonary Disease Brother          age 50 due to complications from copd. heavy smoker     Alcoholism Brother          ROS:    No fevers or chills  No weight loss  No blurry vision, double vision or change in vision  No sore throat  No lymphadenopathy  No headache, paraesthesias, or weakness in a limb  No shortness of breath or wheezing  No chest pain or pressure  No arthralgias or myalgias  No rashes or skin changes  No odynophagia or dysphagia  No BRBPR, hematochezia, melena  No dysuria, frequency or urgency  No hot/cold intolerance or polyria  No anxiety or depression  PHYSICAL EXAMINATION:  Constitutional: aaox3, cooperative, pleasant, not dyspneic/diaphoretic, no acute distress  Vitals reviewed: /90   Pulse 83   Ht 1.702 m (5' 7\")   Wt 82.7 kg (182 lb 4.8 oz)   SpO2 99%   BMI " 28.55 kg/m    Wt:   Wt Readings from Last 2 Encounters:   02/21/19 82.7 kg (182 lb 4.8 oz)   12/10/18 81.6 kg (180 lb)      Eyes: Sclera anicteric/injected  Ears/nose/mouth/throat: Normal oropharynx without ulcers or exudate, mucus membranes moist, hearing intact  Neck: supple, thyroid normal size  CV: No edema, RRR  Respiratory: Unlabored breathing, CTAB  Lymph: No submandibular, supraclavicular or inguinal lymphadenopathy  Abd:  Nondistended, no masses, +bs, no hepatosplenomegaly, nontender, no peritoneal signs  Skin: warm, perfused, no jaundice  Psych: Normal affect  MSK: Normal gait      PERTINENT STUDIES: (I personally reviewed these laboratory studies today)  Most recent CBC:  Recent Labs   Lab Test 10/29/18  0715   WBC 5.0   HGB 14.1   HCT 42.4        Most recent hepatic panel:  Recent Labs   Lab Test 10/29/18  0715   ALT 40   AST 24     Most recent creatinine:  Recent Labs   Lab Test 10/29/18  0715   CR 0.90         ASSESSMENT/PLAN:    Edmar Forrest is a 48 year old male who presents for evaluation of solid food dysphagia.  History significant for GERD as well as food allergies.  He may have a peptic stricture or eosinophilic esophagitis related stricture.  He requires upper endoscopy for further evaluation.  We do note his history of life-threatening esophageal hemorrhage after vomiting.  An EGD with dilation if there is a stricture is indicated.  I do not think that his prior history of esophageal hemorrhage makes the procedure above average risk.  He can safely schedule the procedure at the [facility] we will plan to evaluate, possible biopsies, possible dilation.  He is in agreement with the plan.    Dysphagia, unspecified type     Clinic follow-up to be determined based on endoscopy findings.    Thank you for this consultation.  It was a pleasure to participate in the care of this patient; please contact us with any further questions.     This note was created with voice recognition  software, and while reviewed for accuracy, typos may remain.     Maciej Harkins MD  Adjunct  of Medicine  Division of Gastroenterology, Hepatology and Nutrition  [facility]  [phone]

## 2019-02-22 NOTE — H&P (VIEW-ONLY)
GASTROENTEROLOGY NEW PATIENT CLINIC VISIT    CC/REFERRING MD:    Clinic, [facility]  Hortencia Landers MD    REASON FOR CONSULTATION:   Maciej Lainez* for   Chief Complaint   Patient presents with     Consult     Difficulty swallowing       HISTORY OF PRESENT ILLNESS:    Edmar Forrest is 48 year old male who presents for evaluation of dysphagia.  He notes that he has a 6-month history of progressive dysphagia to solids and pills.  He feels like he has food become impacted in his esophagus and eventually he is able to retch it back up.  He does not have any problems with liquids.  He is not having any hematemesis.  He has food allergies including melons and bananas.  He does get frequent symptoms of GERD and takes Prilosec and Zantac.  He gets much more severe GERD if he does not take these.  He reports a history in 2012 of vomiting leading to a life-threatening esophageal hemorrhage.  This occurred in [place] and he reports that he spent several days in the hospital and he may have even had a cardiac arrest related to blood loss.  He reports he had an EGD at that time though no Endo-therapy was needed and he did not receive esophageal surgery.  He has not had any recurrence of similar episodes.  He notes he does take Motrin 2-3 days/week.  History notable for grandfather with esophageal cancer.      PROBLEM LIST  Patient Active Problem List    Diagnosis Date Noted     Insomnia, unspecified type 05/04/2018     Priority: Medium     History of alcohol dependence (H) 05/03/2018     Priority: Medium     Sober since 2014. AA in past, but didn't find helpful        Mild episode of recurrent major depressive disorder (H) 05/03/2018     Priority: Medium     Anxiety 05/03/2018     Priority: Medium     Lipoma of skin and subcutaneous tissue 05/03/2018     Priority: Medium       PERTINENT PAST MEDICAL HISTORY:  (I personally reviewed this history with the patient at today's visit)   Past  Medical History:   Diagnosis Date     Anemia 2012    secondary to esophageal perforation     Cardiac arrest (H) 2012    due to esophageal perforation     Esophageal perforation 2012    due to excessive vomiting         PREVIOUS SURGERIES: (I personally reviewed this history with the patient at today's visit)   Past Surgical History:   Procedure Laterality Date     ARTHROSCOPY KNEE RT/LT  1989         ALLERGIES:   No Known Allergies    PERTINENT MEDICATIONS:    Current Outpatient Medications:      buPROPion (WELLBUTRIN XL) 150 MG 24 hr tablet, Take 1 tablet (150 mg) by mouth every morning, Disp: 90 tablet, Rfl: 1     busPIRone (BUSPAR) 15 MG tablet, TWO TABLETS IN THE MORNING AND 1.5 TABLETS IN THE AFTERNOON. iF NEEDED, INCREASE TO TWO TABLETS TWICE DAILY AFTER TWO WEEKS, Disp: 360 tablet, Rfl: 1     fluticasone (FLONASE) 50 MCG/ACT nasal spray, Spray 1 spray into both nostrils daily, Disp: 1 Bottle, Rfl: 11     Omeprazole (PRILOSEC PO), Take 20 mg by mouth, Disp: , Rfl:      ranitidine (ZANTAC) 150 MG tablet, Take 150 mg by mouth At Bedtime, Disp: , Rfl:      traZODone (DESYREL) 150 MG tablet, Take 1 tablet (150 mg) by mouth At Bedtime, Disp: 90 tablet, Rfl: 1    SOCIAL HISTORY:  Social History     Socioeconomic History     Marital status:      Spouse name: Not on file     Number of children: Not on file     Years of education: Not on file     Highest education level: Not on file   Occupational History     Not on file   Social Needs     Financial resource strain: Not on file     Food insecurity:     Worry: Not on file     Inability: Not on file     Transportation needs:     Medical: Not on file     Non-medical: Not on file   Tobacco Use     Smoking status: Never Smoker     Smokeless tobacco: Never Used   Substance and Sexual Activity     Alcohol use: No     Drug use: No     Comment: recreational use in 20's/30s- No IV drug use. cocaine, THC, meth. Has also used LSD, ectacy     Sexual activity: Yes      "Partners: Female     Birth control/protection: OCP   Lifestyle     Physical activity:     Days per week: Not on file     Minutes per session: Not on file     Stress: Not on file   Relationships     Social connections:     Talks on phone: Not on file     Gets together: Not on file     Attends Religion service: Not on file     Active member of club or organization: Not on file     Attends meetings of clubs or organizations: Not on file     Relationship status: Not on file     Intimate partner violence:     Fear of current or ex partner: Not on file     Emotionally abused: Not on file     Physically abused: Not on file     Forced sexual activity: Not on file   Other Topics Concern     Parent/sibling w/ CABG, MI or angioplasty before 65F 55M? Not Asked   Social History Narrative     Not on file       FAMILY HISTORY: (I personally reviewed this history with the patient at today's visit)  Family History   Problem Relation Age of Onset     Breast Cancer Mother 70     Hypertension Mother      Heart Disease Father         ? rhythm     Arthritis Father      Nephrolithiasis Father      Nephrolithiasis Sister      Chronic Obstructive Pulmonary Disease Brother          age 50 due to complications from copd. heavy smoker     Alcoholism Brother          ROS:    No fevers or chills  No weight loss  No blurry vision, double vision or change in vision  No sore throat  No lymphadenopathy  No headache, paraesthesias, or weakness in a limb  No shortness of breath or wheezing  No chest pain or pressure  No arthralgias or myalgias  No rashes or skin changes  No odynophagia or dysphagia  No BRBPR, hematochezia, melena  No dysuria, frequency or urgency  No hot/cold intolerance or polyria  No anxiety or depression  PHYSICAL EXAMINATION:  Constitutional: aaox3, cooperative, pleasant, not dyspneic/diaphoretic, no acute distress  Vitals reviewed: /90   Pulse 83   Ht 1.702 m (5' 7\")   Wt 82.7 kg (182 lb 4.8 oz)   SpO2 99%   BMI " 28.55 kg/m    Wt:   Wt Readings from Last 2 Encounters:   02/21/19 82.7 kg (182 lb 4.8 oz)   12/10/18 81.6 kg (180 lb)      Eyes: Sclera anicteric/injected  Ears/nose/mouth/throat: Normal oropharynx without ulcers or exudate, mucus membranes moist, hearing intact  Neck: supple, thyroid normal size  CV: No edema, RRR  Respiratory: Unlabored breathing, CTAB  Lymph: No submandibular, supraclavicular or inguinal lymphadenopathy  Abd:  Nondistended, no masses, +bs, no hepatosplenomegaly, nontender, no peritoneal signs  Skin: warm, perfused, no jaundice  Psych: Normal affect  MSK: Normal gait      PERTINENT STUDIES: (I personally reviewed these laboratory studies today)  Most recent CBC:  Recent Labs   Lab Test 10/29/18  0715   WBC 5.0   HGB 14.1   HCT 42.4        Most recent hepatic panel:  Recent Labs   Lab Test 10/29/18  0715   ALT 40   AST 24     Most recent creatinine:  Recent Labs   Lab Test 10/29/18  0715   CR 0.90         ASSESSMENT/PLAN:    Edmar Forrest is a 48 year old male who presents for evaluation of solid food dysphagia.  History significant for GERD as well as food allergies.  He may have a peptic stricture or eosinophilic esophagitis related stricture.  He requires upper endoscopy for further evaluation.  We do note his history of life-threatening esophageal hemorrhage after vomiting.  An EGD with dilation if there is a stricture is indicated.  I do not think that his prior history of esophageal hemorrhage makes the procedure above average risk.  He can safely schedule the procedure at the [facility] we will plan to evaluate, possible biopsies, possible dilation.  He is in agreement with the plan.    Dysphagia, unspecified type     Clinic follow-up to be determined based on endoscopy findings.    Thank you for this consultation.  It was a pleasure to participate in the care of this patient; please contact us with any further questions.     This note was created with voice recognition  software, and while reviewed for accuracy, typos may remain.     Maciej Harkins MD  Adjunct  of Medicine  Division of Gastroenterology, Hepatology and Nutrition  [facility]  [phone]

## 2019-02-26 ENCOUNTER — ANESTHESIA EVENT (OUTPATIENT)
Dept: SURGERY | Facility: AMBULATORY SURGERY CENTER | Age: 49
End: 2019-02-26

## 2019-02-27 ENCOUNTER — SURGERY (OUTPATIENT)
Age: 49
End: 2019-02-27
Payer: COMMERCIAL

## 2019-02-27 ENCOUNTER — ANESTHESIA (OUTPATIENT)
Dept: SURGERY | Facility: AMBULATORY SURGERY CENTER | Age: 49
End: 2019-02-27
Payer: COMMERCIAL

## 2019-02-27 ENCOUNTER — HOSPITAL ENCOUNTER (OUTPATIENT)
Facility: AMBULATORY SURGERY CENTER | Age: 49
Discharge: HOME | End: 2019-02-27
Attending: INTERNAL MEDICINE | Admitting: INTERNAL MEDICINE
Payer: COMMERCIAL

## 2019-02-27 VITALS — HEART RATE: 60 BPM

## 2019-02-27 VITALS
OXYGEN SATURATION: 99 % | TEMPERATURE: 98.5 F | RESPIRATION RATE: 18 BRPM | HEART RATE: 63 BPM | DIASTOLIC BLOOD PRESSURE: 76 MMHG | SYSTOLIC BLOOD PRESSURE: 127 MMHG

## 2019-02-27 LAB — UPPER GI ENDOSCOPY: NORMAL

## 2019-02-27 PROCEDURE — 43239 EGD BIOPSY SINGLE/MULTIPLE: CPT

## 2019-02-27 PROCEDURE — G8918 PT W/O PREOP ORDER IV AB PRO: HCPCS

## 2019-02-27 PROCEDURE — G8907 PT DOC NO EVENTS ON DISCHARG: HCPCS

## 2019-02-27 PROCEDURE — 88305 TISSUE EXAM BY PATHOLOGIST: CPT | Performed by: INTERNAL MEDICINE

## 2019-02-27 PROCEDURE — 43239 EGD BIOPSY SINGLE/MULTIPLE: CPT | Performed by: INTERNAL MEDICINE

## 2019-02-27 PROCEDURE — 00000160 ZZHCL STATISTIC H-SPECIAL HANDLING: Performed by: INTERNAL MEDICINE

## 2019-02-27 PROCEDURE — 87624 HPV HI-RISK TYP POOLED RSLT: CPT | Performed by: INTERNAL MEDICINE

## 2019-02-27 PROCEDURE — 00000159 ZZHCL STATISTIC H-SEND OUTS PREP: Performed by: INTERNAL MEDICINE

## 2019-02-27 RX ORDER — ONDANSETRON 4 MG/1
4 TABLET, ORALLY DISINTEGRATING ORAL EVERY 30 MIN PRN
Status: DISCONTINUED | OUTPATIENT
Start: 2019-02-27 | End: 2019-02-28 | Stop reason: HOSPADM

## 2019-02-27 RX ORDER — ONDANSETRON 2 MG/ML
4 INJECTION INTRAMUSCULAR; INTRAVENOUS EVERY 30 MIN PRN
Status: DISCONTINUED | OUTPATIENT
Start: 2019-02-27 | End: 2019-02-28 | Stop reason: HOSPADM

## 2019-02-27 RX ORDER — LIDOCAINE 40 MG/G
CREAM TOPICAL
Status: DISCONTINUED | OUTPATIENT
Start: 2019-02-27 | End: 2019-02-28 | Stop reason: HOSPADM

## 2019-02-27 RX ORDER — PROPOFOL 10 MG/ML
INJECTION, EMULSION INTRAVENOUS CONTINUOUS PRN
Status: DISCONTINUED | OUTPATIENT
Start: 2019-02-27 | End: 2019-02-27

## 2019-02-27 RX ORDER — PROPOFOL 10 MG/ML
INJECTION, EMULSION INTRAVENOUS PRN
Status: DISCONTINUED | OUTPATIENT
Start: 2019-02-27 | End: 2019-02-27

## 2019-02-27 RX ORDER — MEPERIDINE HYDROCHLORIDE 25 MG/ML
12.5 INJECTION INTRAMUSCULAR; INTRAVENOUS; SUBCUTANEOUS
Status: DISCONTINUED | OUTPATIENT
Start: 2019-02-27 | End: 2019-02-28 | Stop reason: HOSPADM

## 2019-02-27 RX ORDER — HYDROMORPHONE HYDROCHLORIDE 1 MG/ML
.3-.5 INJECTION, SOLUTION INTRAMUSCULAR; INTRAVENOUS; SUBCUTANEOUS EVERY 10 MIN PRN
Status: DISCONTINUED | OUTPATIENT
Start: 2019-02-27 | End: 2019-02-28 | Stop reason: HOSPADM

## 2019-02-27 RX ORDER — SODIUM CHLORIDE, SODIUM LACTATE, POTASSIUM CHLORIDE, CALCIUM CHLORIDE 600; 310; 30; 20 MG/100ML; MG/100ML; MG/100ML; MG/100ML
INJECTION, SOLUTION INTRAVENOUS CONTINUOUS PRN
Status: DISCONTINUED | OUTPATIENT
Start: 2019-02-27 | End: 2019-02-27

## 2019-02-27 RX ORDER — SODIUM CHLORIDE, SODIUM LACTATE, POTASSIUM CHLORIDE, CALCIUM CHLORIDE 600; 310; 30; 20 MG/100ML; MG/100ML; MG/100ML; MG/100ML
INJECTION, SOLUTION INTRAVENOUS CONTINUOUS
Status: DISCONTINUED | OUTPATIENT
Start: 2019-02-27 | End: 2019-02-28 | Stop reason: HOSPADM

## 2019-02-27 RX ORDER — ONDANSETRON 2 MG/ML
4 INJECTION INTRAMUSCULAR; INTRAVENOUS
Status: DISCONTINUED | OUTPATIENT
Start: 2019-02-27 | End: 2019-02-28 | Stop reason: HOSPADM

## 2019-02-27 RX ORDER — FENTANYL CITRATE 50 UG/ML
25-50 INJECTION, SOLUTION INTRAMUSCULAR; INTRAVENOUS
Status: DISCONTINUED | OUTPATIENT
Start: 2019-02-27 | End: 2019-02-28 | Stop reason: HOSPADM

## 2019-02-27 RX ORDER — LIDOCAINE HYDROCHLORIDE 20 MG/ML
INJECTION, SOLUTION INFILTRATION; PERINEURAL PRN
Status: DISCONTINUED | OUTPATIENT
Start: 2019-02-27 | End: 2019-02-27

## 2019-02-27 RX ORDER — NALOXONE HYDROCHLORIDE 0.4 MG/ML
.1-.4 INJECTION, SOLUTION INTRAMUSCULAR; INTRAVENOUS; SUBCUTANEOUS
Status: DISCONTINUED | OUTPATIENT
Start: 2019-02-27 | End: 2019-02-28 | Stop reason: HOSPADM

## 2019-02-27 RX ADMIN — LIDOCAINE HYDROCHLORIDE 100 MG: 20 INJECTION, SOLUTION INFILTRATION; PERINEURAL at 14:08

## 2019-02-27 RX ADMIN — PROPOFOL 30 MG: 10 INJECTION, EMULSION INTRAVENOUS at 14:17

## 2019-02-27 RX ADMIN — PROPOFOL 200 MCG/KG/MIN: 10 INJECTION, EMULSION INTRAVENOUS at 14:09

## 2019-02-27 RX ADMIN — SODIUM CHLORIDE, SODIUM LACTATE, POTASSIUM CHLORIDE, CALCIUM CHLORIDE: 600; 310; 30; 20 INJECTION, SOLUTION INTRAVENOUS at 14:04

## 2019-02-27 RX ADMIN — PROPOFOL 40 MG: 10 INJECTION, EMULSION INTRAVENOUS at 14:11

## 2019-02-27 RX ADMIN — PROPOFOL 120 MG: 10 INJECTION, EMULSION INTRAVENOUS at 14:09

## 2019-02-27 NOTE — INTERVAL H&P NOTE
I have evaluated and examined the patient today.  There are no changed to this update since the time of his NPV/H@P performed by me on 2/21/19.  Maciej Harkins MD

## 2019-02-27 NOTE — ANESTHESIA PREPROCEDURE EVALUATION
Anesthesia Pre-Procedure Evaluation    Patient: Edmar Forrest   MRN:     [id number] Gender:   male   Age:    48 year old :      1970        Preoperative Diagnosis: Per Dr. Harkins- he will do pre op   Procedure(s):  COMBINED ESOPHAGOSCOPY, GASTROSCOPY, DUODENOSCOPY (EGD) WITH CO2 INSUFFLATION     Past Medical History:   Diagnosis Date     Anemia     secondary to esophageal perforation     Cardiac arrest (H)     due to esophageal perforation     Esophageal perforation     due to excessive vomiting      Past Surgical History:   Procedure Laterality Date     ARTHROSCOPY KNEE RT/LT            Anesthesia Evaluation     .             ROS/MED HX    ENT/Pulmonary:  - neg pulmonary ROS     Neurologic:  - neg neurologic ROS     Cardiovascular:  - neg cardiovascular ROS       METS/Exercise Tolerance:  >4 METS   Hematologic:  - neg hematologic  ROS       Musculoskeletal:  - neg musculoskeletal ROS       GI/Hepatic: Comment: History of vomiting in  leading to massive esophageal hemorrhage with possible cardiac arrest at time per patient who now presents with symptoms of food getting stuck in his throat and difficulty swallowing.  He is going to the OR today for EGD with possible dilation under MAC    (+) GERD       Renal/Genitourinary:  - ROS Renal section negative       Endo:  - neg endo ROS       Psychiatric: Comment: History of alcohol dependence, sober since     (+) anxiety      Infectious Disease:  - neg infectious disease ROS       Malignancy:      - no malignancy   Other:                         PHYSICAL EXAM:   Mental Status/Neuro: A/A/O   Airway: Facies: Feasible  Mallampati: I  Mouth/Opening: Full  TM distance: > 6 cm  Neck ROM: Full   Respiratory: Auscultation: CTAB     Resp. Rate: Normal     Resp. Effort: Normal      CV: Rhythm: Regular  Rate: Age appropriate  Heart: Normal Sounds   Comments:      Dental: Normal                  Lab Results   Component Value Date    WBC 5.0 10/29/2018  "   HGB 14.1 10/29/2018    HCT 42.4 10/29/2018     10/29/2018     10/29/2018    POTASSIUM 4.3 10/29/2018    CHLORIDE 109 10/29/2018    CO2 29 10/29/2018    BUN 15 10/29/2018    CR 0.90 10/29/2018     (H) 10/29/2018    CHRISTINA 8.5 10/29/2018    ALBUMIN 3.8 10/29/2018    PROTTOTAL 6.2 (L) 10/29/2018    ALT 40 10/29/2018    AST 24 10/29/2018    ALKPHOS 34 (L) 10/29/2018    BILITOTAL 0.9 10/29/2018    TSH 0.96 10/29/2018       Preop Vitals  BP Readings from Last 3 Encounters:   02/21/19 142/90   12/10/18 127/82   11/12/18 142/90    Pulse Readings from Last 3 Encounters:   02/21/19 83   12/10/18 66   11/12/18 72      Resp Readings from Last 3 Encounters:   12/10/18 18   11/12/18 18   11/05/18 16    SpO2 Readings from Last 3 Encounters:   02/21/19 99%   12/10/18 97%   11/12/18 99%      Temp Readings from Last 1 Encounters:   12/10/18 36.8  C (98.3  F) (Oral)    Ht Readings from Last 1 Encounters:   02/21/19 1.702 m (5' 7\")      Wt Readings from Last 1 Encounters:   02/21/19 82.7 kg (182 lb 4.8 oz)    Estimated body mass index is 28.55 kg/m  as calculated from the following:    Height as of 2/21/19: 1.702 m (5' 7\").    Weight as of 2/21/19: 82.7 kg (182 lb 4.8 oz).     LDA:            Assessment:   ASA SCORE: 2    NPO Status: > 2 hours since completed Clear Liquids; > 6 hours since completed Solid Foods   Documentation: H&P complete; Preop Testing complete; Consents complete   Proceeding: Proceed without further delay  Tobacco Use:  NO Active use of Tobacco/UNKNOWN Tobacco use status     Plan:   Anes. Type:  MAC   Pre-Induction: Midazolam IV   Induction:  IV (Standard)   Airway: Native Airway   Access/Monitoring: PIV   Maintenance: Propofol; IV   Emergence: Procedure Site   Logistics: Same Day Surgery     Postop Pain/Sedation Strategy:  Standard-Options: Opioids PRN     PONV Management:  Adult Risk Factors:, Non-Smoker, Postop Opioids  Prevention: Propofol Infusion; Ondansetron     CONSENT: Direct " conversation   Plan and risks discussed with: Patient   Blood Products: Consented (ALL Blood Products)                         Toro Sim MD

## 2019-02-27 NOTE — ANESTHESIA POSTPROCEDURE EVALUATION
Anesthesia POST Procedure Evaluation    Patient: Edmar Forrest   MRN:     [id number] Gender:   male   Age:    48 year old :      1970        Preoperative Diagnosis: Per Dr. Harkins- he will do pre op   Procedure(s):  COMBINED ESOPHAGOSCOPY, GASTROSCOPY, DUODENOSCOPY (EGD) WITH CO2 INSUFFLATION  Combined Esophagoscopy, Gastroscopy, Duodenoscopy (Egd), Biopsy Single Or Multiple   Postop Comments: No value filed.       Anesthesia Type:  MAC    Reportable Event: NO     PAIN: Uncomplicated   Sign Out status: Comfortable, Well controlled pain     PONV: No PONV   Sign Out status:  No Nausea or Vomiting     Neuro/Psych: Uneventful perioperative course   Sign Out Status: Preoperative baseline; Age appropriate mentation     Airway/Resp.: Uneventful perioperative course   Sign Out Status: Non labored breathing, age appropriate RR; Resp. Status within EXPECTED Parameters     CV: Uneventful perioperative course   Sign Out status: Appropriate BP and perfusion indices; Appropriate HR/Rhythm     Disposition:   Sign Out in:  PACU  Disposition:  Phase II; Home  Recovery Course: Uneventful  Follow-Up: Not required     Comments/Narrative:  Patient doing well post-operatively.  No significant issues.  Hemodynamically stable, pain well controlled, nausea well controlled.  Stable for discharge from the PACU             Last Anesthesia Record Vitals:  CRNA VITALS  2019 1405 - 2019 1505      2019             Pulse:  19  (Abnormal)           Last PACU/Preop Vitals:  Vitals:    19 1443 19 1500 19 1514   BP: 128/78 117/71    Pulse: 63 62 65   Resp: 18 18 18   Temp:      SpO2: 97% 99% 99%         Electronically Signed By: Toro Sim MD, 2019, 3:17 PM

## 2019-02-27 NOTE — ANESTHESIA CARE TRANSFER NOTE
Patient: Edmar ESCOBEDO Lon    Procedure(s):  COMBINED ESOPHAGOSCOPY, GASTROSCOPY, DUODENOSCOPY (EGD) WITH CO2 INSUFFLATION  Combined Esophagoscopy, Gastroscopy, Duodenoscopy (Egd), Biopsy Single Or Multiple    Diagnosis: Per Dr. Harkins- he will do pre op  Diagnosis Additional Information: No value filed.    Anesthesia Type:   MAC     Note:  Airway :Room Air  Patient transferred to:Phase II  Comments: To Phase II. Report to RN.  VSS Resp status stable.Handoff Report: Identifed the Patient, Identified the Reponsible Provider, Reviewed the pertinent medical history, Discussed the surgical course, Reviewed Intra-OP anesthesia mangement and issues during anesthesia, Set expectations for post-procedure period and Allowed opportunity for questions and acknowledgement of understanding      Vitals: (Last set prior to Anesthesia Care Transfer)    CRNA VITALS  2/27/2019 1405 - 2/27/2019 1438      2/27/2019             Pulse:  19  (Abnormal)                 Electronically Signed By: YOU Bryant CRNA  February 27, 2019  2:38 PM

## 2019-03-01 DIAGNOSIS — K20.0 EOSINOPHILIC ESOPHAGITIS: Primary | ICD-10-CM

## 2019-03-01 LAB — COPATH REPORT: NORMAL

## 2019-03-01 RX ORDER — BUDESONIDE 1 MG/2ML
INHALANT ORAL
Qty: 180 AMPULE | Refills: 6 | Status: SHIPPED | OUTPATIENT
Start: 2019-03-01 | End: 2019-03-18

## 2019-03-01 NOTE — RESULT ENCOUNTER NOTE
Looks like severe eosinophilic esophagitis.  I have given him Rx for swallowed budesonide.  Will repeat EGD with MAC in 6-8 weeks to assess for response.  Maciej Harkins MD

## 2019-03-18 ENCOUNTER — TELEPHONE (OUTPATIENT)
Dept: GASTROENTEROLOGY | Facility: CLINIC | Age: 49
End: 2019-03-18

## 2019-03-18 DIAGNOSIS — K20.0 EOSINOPHILIC ESOPHAGITIS: ICD-10-CM

## 2019-03-18 RX ORDER — BUDESONIDE 1 MG/2ML
INHALANT ORAL
Qty: 180 AMPULE | Refills: 6 | Status: SHIPPED | OUTPATIENT
Start: 2019-03-18 | End: 2019-05-08

## 2019-03-18 NOTE — TELEPHONE ENCOUNTER
[facility] Call Center    Phone Message    May a detailed message be left on voicemail: yes    Reason for Call: Patient is having trouble getting budesonide (PULMICORT) 1 MG/2ML neb solution.  He has been trying to work with a compounding pharmacy in [place] is having trouble getting the medication.  Requesting a call to discuss an alternative or suggestions on a different compounding pharmacy.  Please advise.  Thank you.     Action Taken: Message routed to:  Adult Clinics: Gastroenterology (GI) p [phone]

## 2019-03-18 NOTE — TELEPHONE ENCOUNTER
Nurse called patient and updated that the Rx would be resent to [facility] compound[facility], gave him the number to call if he has heard from them by tomorrow.    Keerthi Madden RN, BSN, PHN  [facility]  GI/Gen Surg/Hepatology Care Coordinator

## 2019-03-20 LAB — COPATH REPORT: NORMAL

## 2019-04-10 ENCOUNTER — ANCILLARY PROCEDURE (OUTPATIENT)
Dept: GENERAL RADIOLOGY | Facility: CLINIC | Age: 49
End: 2019-04-10
Attending: NURSE PRACTITIONER
Payer: COMMERCIAL

## 2019-04-10 ENCOUNTER — OFFICE VISIT (OUTPATIENT)
Dept: FAMILY MEDICINE | Facility: CLINIC | Age: 49
End: 2019-04-10
Payer: COMMERCIAL

## 2019-04-10 VITALS
SYSTOLIC BLOOD PRESSURE: 128 MMHG | OXYGEN SATURATION: 98 % | HEIGHT: 67 IN | RESPIRATION RATE: 18 BRPM | WEIGHT: 180.9 LBS | DIASTOLIC BLOOD PRESSURE: 78 MMHG | TEMPERATURE: 98.4 F | BODY MASS INDEX: 28.39 KG/M2 | HEART RATE: 69 BPM

## 2019-04-10 DIAGNOSIS — Z01.818 PREOP GENERAL PHYSICAL EXAM: Primary | ICD-10-CM

## 2019-04-10 DIAGNOSIS — F33.0 MILD EPISODE OF RECURRENT MAJOR DEPRESSIVE DISORDER (H): ICD-10-CM

## 2019-04-10 DIAGNOSIS — R13.10 DYSPHAGIA, UNSPECIFIED TYPE: ICD-10-CM

## 2019-04-10 DIAGNOSIS — G47.00 INSOMNIA, UNSPECIFIED TYPE: ICD-10-CM

## 2019-04-10 DIAGNOSIS — F41.9 ANXIETY: ICD-10-CM

## 2019-04-10 DIAGNOSIS — M79.662 PAIN IN LEFT SHIN: ICD-10-CM

## 2019-04-10 LAB
ANION GAP SERPL CALCULATED.3IONS-SCNC: 6 MMOL/L (ref 3–14)
BUN SERPL-MCNC: 24 MG/DL (ref 7–30)
CALCIUM SERPL-MCNC: 9 MG/DL (ref 8.5–10.1)
CHLORIDE SERPL-SCNC: 110 MMOL/L (ref 94–109)
CO2 SERPL-SCNC: 26 MMOL/L (ref 20–32)
CREAT SERPL-MCNC: 1.02 MG/DL (ref 0.66–1.25)
ERYTHROCYTE [DISTWIDTH] IN BLOOD BY AUTOMATED COUNT: 12.3 % (ref 10–15)
GFR SERPL CREATININE-BSD FRML MDRD: 86 ML/MIN/{1.73_M2}
GLUCOSE SERPL-MCNC: 98 MG/DL (ref 70–99)
HCT VFR BLD AUTO: 43.2 % (ref 40–53)
HGB BLD-MCNC: 14.6 G/DL (ref 13.3–17.7)
MCH RBC QN AUTO: 32.4 PG (ref 26.5–33)
MCHC RBC AUTO-ENTMCNC: 33.8 G/DL (ref 31.5–36.5)
MCV RBC AUTO: 96 FL (ref 78–100)
PLATELET # BLD AUTO: 294 10E9/L (ref 150–450)
POTASSIUM SERPL-SCNC: 4.3 MMOL/L (ref 3.4–5.3)
RBC # BLD AUTO: 4.5 10E12/L (ref 4.4–5.9)
SODIUM SERPL-SCNC: 142 MMOL/L (ref 133–144)
WBC # BLD AUTO: 5.5 10E9/L (ref 4–11)

## 2019-04-10 PROCEDURE — 85027 COMPLETE CBC AUTOMATED: CPT | Performed by: NURSE PRACTITIONER

## 2019-04-10 PROCEDURE — 73590 X-RAY EXAM OF LOWER LEG: CPT | Mod: LT

## 2019-04-10 PROCEDURE — 80048 BASIC METABOLIC PNL TOTAL CA: CPT | Performed by: NURSE PRACTITIONER

## 2019-04-10 PROCEDURE — 36415 COLL VENOUS BLD VENIPUNCTURE: CPT | Performed by: NURSE PRACTITIONER

## 2019-04-10 PROCEDURE — 99214 OFFICE O/P EST MOD 30 MIN: CPT | Performed by: NURSE PRACTITIONER

## 2019-04-10 RX ORDER — BUPROPION HYDROCHLORIDE 150 MG/1
150 TABLET ORAL EVERY MORNING
Qty: 90 TABLET | Refills: 1 | Status: SHIPPED | OUTPATIENT
Start: 2019-04-10

## 2019-04-10 RX ORDER — BUSPIRONE HYDROCHLORIDE 15 MG/1
30 TABLET ORAL 2 TIMES DAILY
Qty: 360 TABLET | Refills: 1 | Status: SHIPPED | OUTPATIENT
Start: 2019-04-10

## 2019-04-10 RX ORDER — TRAZODONE HYDROCHLORIDE 150 MG/1
150 TABLET ORAL AT BEDTIME
Qty: 90 TABLET | Refills: 1 | Status: SHIPPED | OUTPATIENT
Start: 2019-04-10

## 2019-04-10 RX ORDER — BUSPIRONE HYDROCHLORIDE 15 MG/1
30 TABLET ORAL 2 TIMES DAILY
Qty: 360 TABLET | Refills: 1 | Status: SHIPPED | OUTPATIENT
Start: 2019-04-10 | End: 2019-04-10

## 2019-04-10 ASSESSMENT — PAIN SCALES - GENERAL: PAINLEVEL: MILD PAIN (3)

## 2019-04-10 ASSESSMENT — MIFFLIN-ST. JEOR: SCORE: 1644.19

## 2019-04-10 NOTE — PROGRESS NOTES
[facility]  [address] [place] [id number]  [phone]  Dept: [phone]    PRE-OP EVALUATION:  Today's date: 4/10/2019    Edmar Forrest (: 1970) presents for pre-operative evaluation assessment as requested by Dr. Harkins.  He requires evaluation and anesthesia risk assessment prior to undergoing surgery/procedure for treatment of Endoscopy .    Proposed Surgery/ Procedure: Endoscopy  Date of Surgery/ Procedure: 19  Time of Surgery/ Procedure: [facility]  Hospital/Surgical Facility: [facility]    Primary Physician: [facility]  Type of Anesthesia Anticipated: General    Patient has a Health Care Directive or Living Will:  NO    1. NO - Do you have a history of heart attack, stroke, stent, bypass or surgery on an artery in the head, neck, heart or legs?  2. NO - Do you ever have any pain or discomfort in your chest?  3. NO - Do you have a history of  Heart Failure?  4. NO - Are you troubled by shortness of breath when: walking on the level, up a slight hill or at night?  5. NO - Do you currently have a cold, bronchitis or other respiratory infection?  6. NO - Do you have a cough, shortness of breath or wheezing?  7. NO - Do you sometimes get pains in the calves of your legs when you walk?  8. NO - Do you or anyone in your family have previous history of blood clots?  9. NO - Do you or does anyone in your family have a serious bleeding problem such as prolonged bleeding following surgeries or cuts?  10. NO - Have you ever had problems with anemia or been told to take iron pills?  11. NO - Have you had any abnormal blood loss such as black, tarry or bloody stools, or abnormal vaginal bleeding?  12.YES - Have you ever had a blood transfusion? Related to anemia from esophageal tear in   13. NO - Have you or any of your relatives ever had problems with anesthesia?  14. NO - Do you have sleep apnea, excessive snoring or daytime drowsiness?  15. NO - Do you  have any prosthetic heart valves?  16. NO - Do you have prosthetic joints?  17. NO - Is there any chance that you may be pregnant?      HPI:     HPI related to upcoming procedure: having trouble swallowing: tried to dialate 2/2019, did biopsy, no cancer. Wanted to re-look at it.     Anxiety/depression: stable with buspar and wellbutrin  GERD: stable using omeprazole in am and zantac in pm  Insomnia: stable with nightly trazodone    Multivitamin. Colon cleanse.     Having left shin pain. States he hurts himself at work frequently but doesn't always realize it. States pain started 1 day ago, much worse as the day goes on. Radiates up calf into posterior knee      MEDICAL HISTORY:     Patient Active Problem List    Diagnosis Date Noted     Insomnia, unspecified type 05/04/2018     Priority: Medium     History of alcohol dependence (H) 05/03/2018     Priority: Medium     Sober since 2014. AA in past, but didn't find helpful        Mild episode of recurrent major depressive disorder (H) 05/03/2018     Priority: Medium     Anxiety 05/03/2018     Priority: Medium     Lipoma of skin and subcutaneous tissue 05/03/2018     Priority: Medium      Past Medical History:   Diagnosis Date     Anemia 2012    secondary to esophageal perforation     Cardiac arrest (H) 2012    due to esophageal perforation     Esophageal perforation 2012    due to excessive vomiting     Past Surgical History:   Procedure Laterality Date     ARTHROSCOPY KNEE RT/LT  1989     COMBINED ESOPHAGOSCOPY, GASTROSCOPY, DUODENOSCOPY (EGD) WITH CO2 INSUFFLATION N/A 2/27/2019    Procedure: COMBINED ESOPHAGOSCOPY, GASTROSCOPY, DUODENOSCOPY (EGD) WITH CO2 INSUFFLATION;  Surgeon: Maciej Harkins MD;  Location:  OR     ESOPHAGOSCOPY, GASTROSCOPY, DUODENOSCOPY (EGD), COMBINED N/A 2/27/2019    Procedure: Combined Esophagoscopy, Gastroscopy, Duodenoscopy (Egd), Biopsy Single Or Multiple;  Surgeon: Maciej Harkins MD;  Location:  OR     Current  Outpatient Medications   Medication Sig Dispense Refill     budesonide (PULMICORT) 1 MG/2ML neb solution Compound 2 mg of Pulmicort per 5mL of Ora-sweet. Patient should be taking 2 mg per dose TID. 180 ampule 6     buPROPion (WELLBUTRIN XL) 150 MG 24 hr tablet Take 1 tablet (150 mg) by mouth every morning 90 tablet 1     busPIRone (BUSPAR) 15 MG tablet Take 2 tablets (30 mg) by mouth 2 times daily 360 tablet 1     fluticasone (FLONASE) 50 MCG/ACT nasal spray Spray 1 spray into both nostrils daily 1 Bottle 11     Omeprazole (PRILOSEC PO) Take 20 mg by mouth       ranitidine (ZANTAC) 150 MG tablet Take 150 mg by mouth At Bedtime       traZODone (DESYREL) 150 MG tablet Take 1 tablet (150 mg) by mouth At Bedtime 90 tablet 1     OTC products: NSAIDS-nightly ibuprofen advised no use within 7 days of surgery    Allergies   Allergen Reactions     Latex Rash     Pt states his face broke in hives/severe rash after wearing a face mask. Not sure if it had latex elastic or not.     Banana      shock     Melon      shock      Latex Allergy: YES: Precautions to take: Latex allergy    Social History     Tobacco Use     Smoking status: Never Smoker     Smokeless tobacco: Never Used   Substance Use Topics     Alcohol use: No     Comment: sober since 2014     History   Drug Use No     Comment: recreational use in 20's/30s- No IV drug use. cocaine, THC, meth. Has also used LSD, ectacy       REVIEW OF SYSTEMS:   CONSTITUTIONAL: NEGATIVE for fever, chills, change in weight  INTEGUMENTARY/SKIN: NEGATIVE for worrisome rashes, moles or lesions  EYES: NEGATIVE for vision changes or irritation  ENT/MOUTH: NEGATIVE for ear, mouth and throat problems  RESP: NEGATIVE for significant cough or SOB  BREAST: NEGATIVE for masses, tenderness or discharge  CV: NEGATIVE for chest pain, palpitations or peripheral edema  GI: NEGATIVE for nausea, abdominal pain, heartburn, or change in bowel habits  : NEGATIVE for frequency, dysuria, or  "hematuria  MUSCULOSKELETAL: as above  NEURO: NEGATIVE for weakness, dizziness or paresthesias  ENDOCRINE: NEGATIVE for temperature intolerance, skin/hair changes  HEME: NEGATIVE for bleeding problems  PSYCHIATRIC: NEGATIVE for changes in mood or affect    EXAM:   /78 (BP Location: Right arm, Patient Position: Sitting, Cuff Size: Adult Regular)   Pulse 69   Temp 98.4  F (36.9  C) (Oral)   Resp 18   Ht 1.702 m (5' 7\")   Wt 82.1 kg (180 lb 14.4 oz)   SpO2 98%   BMI 28.33 kg/m      GENERAL APPEARANCE: healthy, alert and no distress     EYES: EOMI,  PERRL     HENT: ear canals and TM's normal and nose and mouth without ulcers or lesions     NECK: no adenopathy, no asymmetry, masses, or scars and thyroid normal to palpation     RESP: lungs clear to auscultation - no rales, rhonchi or wheezes     CV: regular rates and rhythm, normal S1 S2, no S3 or S4 and no murmur, click or rub     ABDOMEN:  soft, nontender, no HSM or masses and bowel sounds normal     MS: extremities normal- no gross deformities noted, no evidence of inflammation in joints, FROM in all extremities. Left lower mid-shin tender to touch, slightly swollen, slight red spot noted. No calf pain     SKIN: no suspicious lesions or rashes     NEURO: Normal strength and tone, sensory exam grossly normal, mentation intact and speech normal     PSYCH: mentation appears normal. and affect normal/bright     LYMPHATICS: No cervical adenopathy    DIAGNOSTICS:     EKG: appears normal, NSR, normal axis, normal intervals, no acute ST/T changes c/w ischemia, no LVH by voltage criteria, unchanged from previous tracings  Labs Drawn and in Process:   Unresulted Labs Ordered in the Past 30 Days of this Admission     Date and Time Order Name Status Description    4/10/2019 0721 CBC WITH PLATELETS In process     4/10/2019 0721 BASIC METABOLIC PANEL In process           Recent Labs   Lab Test 10/29/18  0715   HGB 14.1         POTASSIUM 4.3   CR 0.90    "     IMPRESSION:   Reason for surgery/procedure: dysphagia  Diagnosis/reason for consult: repeat EGD for dysphagia and post-biopsy    The proposed surgical procedure is considered INTERMEDIATE risk.    REVISED CARDIAC RISK INDEX  The patient has the following serious cardiovascular risks for perioperative complications such as (MI, PE, VFib and 3  AV Block):  No serious cardiac risks  INTERPRETATION: 1 risks: Class II (low risk - 0.9% complication rate)    The patient has the following additional risks for perioperative complications:  No identified additional risks      ICD-10-CM    1. Preop general physical exam Z01.818 Basic metabolic panel     CBC with platelets     EKG 12-lead complete w/read - Clinics     EKG 12-lead complete w/read - Clinics   2. Dysphagia, unspecified type R13.10    3. Mild episode of recurrent major depressive disorder (H) F33.0 traZODone (DESYREL) 150 MG tablet     buPROPion (WELLBUTRIN XL) 150 MG 24 hr tablet   4. Insomnia, unspecified type G47.00 traZODone (DESYREL) 150 MG tablet   5. Anxiety F41.9 busPIRone (BUSPAR) 15 MG tablet     DISCONTINUED: busPIRone (BUSPAR) 15 MG tablet   6. Pain in left shin M79.662 XR Tibia & Fibula Left 2 Views       Left shin x-ray normal. Likely related to muscle injury. Monitor for now, if worsening pain/swelling/redness can consider ultrasound. Patient verbalized understanding.     RECOMMENDATIONS:     --Patient is to take all scheduled medications on the day of surgery EXCEPT for modifications listed below.  Patient is aware to stop advil/ibuprofen 7 days prior to surgery. Advised tylenol is better before surgery to reduce bleeding risk    APPROVAL GIVEN to proceed with proposed procedure, without further diagnostic evaluation       Signed Electronically by:  YOU Sandhu, NP-C  [facility]    Copy of this evaluation report is provided to requesting physician.    [facility] Preop Guidelines    Revised Cardiac Risk Index

## 2019-05-07 ENCOUNTER — ANESTHESIA EVENT (OUTPATIENT)
Dept: SURGERY | Facility: AMBULATORY SURGERY CENTER | Age: 49
End: 2019-05-07

## 2019-05-08 ENCOUNTER — ANESTHESIA (OUTPATIENT)
Dept: SURGERY | Facility: AMBULATORY SURGERY CENTER | Age: 49
End: 2019-05-08
Payer: COMMERCIAL

## 2019-05-08 ENCOUNTER — HOSPITAL ENCOUNTER (OUTPATIENT)
Facility: AMBULATORY SURGERY CENTER | Age: 49
Discharge: HOME | End: 2019-05-08
Attending: INTERNAL MEDICINE | Admitting: INTERNAL MEDICINE
Payer: COMMERCIAL

## 2019-05-08 ENCOUNTER — SURGERY (OUTPATIENT)
Age: 49
End: 2019-05-08
Payer: COMMERCIAL

## 2019-05-08 VITALS
TEMPERATURE: 97.8 F | HEART RATE: 61 BPM | SYSTOLIC BLOOD PRESSURE: 105 MMHG | OXYGEN SATURATION: 96 % | RESPIRATION RATE: 16 BRPM | DIASTOLIC BLOOD PRESSURE: 65 MMHG

## 2019-05-08 VITALS — HEART RATE: 56 BPM

## 2019-05-08 DIAGNOSIS — K20.0 EOSINOPHILIC ESOPHAGITIS: ICD-10-CM

## 2019-05-08 LAB — UPPER GI ENDOSCOPY: NORMAL

## 2019-05-08 PROCEDURE — 43239 EGD BIOPSY SINGLE/MULTIPLE: CPT

## 2019-05-08 PROCEDURE — G8918 PT W/O PREOP ORDER IV AB PRO: HCPCS

## 2019-05-08 PROCEDURE — 43239 EGD BIOPSY SINGLE/MULTIPLE: CPT | Performed by: INTERNAL MEDICINE

## 2019-05-08 PROCEDURE — 88305 TISSUE EXAM BY PATHOLOGIST: CPT | Performed by: INTERNAL MEDICINE

## 2019-05-08 PROCEDURE — G8907 PT DOC NO EVENTS ON DISCHARG: HCPCS

## 2019-05-08 RX ORDER — SODIUM CHLORIDE, SODIUM LACTATE, POTASSIUM CHLORIDE, CALCIUM CHLORIDE 600; 310; 30; 20 MG/100ML; MG/100ML; MG/100ML; MG/100ML
INJECTION, SOLUTION INTRAVENOUS CONTINUOUS
Status: DISCONTINUED | OUTPATIENT
Start: 2019-05-08 | End: 2019-05-09 | Stop reason: HOSPADM

## 2019-05-08 RX ORDER — ONDANSETRON 2 MG/ML
4 INJECTION INTRAMUSCULAR; INTRAVENOUS
Status: DISCONTINUED | OUTPATIENT
Start: 2019-05-08 | End: 2019-05-09 | Stop reason: HOSPADM

## 2019-05-08 RX ORDER — LIDOCAINE HYDROCHLORIDE 20 MG/ML
INJECTION, SOLUTION INFILTRATION; PERINEURAL PRN
Status: DISCONTINUED | OUTPATIENT
Start: 2019-05-08 | End: 2019-05-08

## 2019-05-08 RX ORDER — LIDOCAINE 40 MG/G
CREAM TOPICAL
Status: DISCONTINUED | OUTPATIENT
Start: 2019-05-08 | End: 2019-05-09 | Stop reason: HOSPADM

## 2019-05-08 RX ORDER — PROPOFOL 10 MG/ML
INJECTION, EMULSION INTRAVENOUS PRN
Status: DISCONTINUED | OUTPATIENT
Start: 2019-05-08 | End: 2019-05-08

## 2019-05-08 RX ORDER — BUDESONIDE 1 MG/2ML
INHALANT ORAL
Qty: 180 AMPULE | Refills: 11 | Status: SHIPPED | OUTPATIENT
Start: 2019-05-08

## 2019-05-08 RX ADMIN — PROPOFOL 50 MG: 10 INJECTION, EMULSION INTRAVENOUS at 07:33

## 2019-05-08 RX ADMIN — PROPOFOL 50 MG: 10 INJECTION, EMULSION INTRAVENOUS at 07:30

## 2019-05-08 RX ADMIN — PROPOFOL 40 MG: 10 INJECTION, EMULSION INTRAVENOUS at 07:58

## 2019-05-08 RX ADMIN — SODIUM CHLORIDE, SODIUM LACTATE, POTASSIUM CHLORIDE, CALCIUM CHLORIDE: 600; 310; 30; 20 INJECTION, SOLUTION INTRAVENOUS at 07:26

## 2019-05-08 RX ADMIN — SODIUM CHLORIDE, SODIUM LACTATE, POTASSIUM CHLORIDE, CALCIUM CHLORIDE: 600; 310; 30; 20 INJECTION, SOLUTION INTRAVENOUS at 06:52

## 2019-05-08 RX ADMIN — LIDOCAINE HYDROCHLORIDE 60 MG: 20 INJECTION, SOLUTION INFILTRATION; PERINEURAL at 07:27

## 2019-05-08 RX ADMIN — LIDOCAINE 1 EACH: 40 CREAM TOPICAL at 07:26

## 2019-05-08 RX ADMIN — PROPOFOL 50 MG: 10 INJECTION, EMULSION INTRAVENOUS at 07:40

## 2019-05-08 RX ADMIN — LIDOCAINE HYDROCHLORIDE 40 MG: 20 INJECTION, SOLUTION INFILTRATION; PERINEURAL at 07:29

## 2019-05-08 RX ADMIN — PROPOFOL 50 MG: 10 INJECTION, EMULSION INTRAVENOUS at 07:54

## 2019-05-08 RX ADMIN — PROPOFOL 50 MG: 10 INJECTION, EMULSION INTRAVENOUS at 07:29

## 2019-05-08 RX ADMIN — PROPOFOL 50 MG: 10 INJECTION, EMULSION INTRAVENOUS at 07:48

## 2019-05-08 NOTE — ANESTHESIA PREPROCEDURE EVALUATION
Anesthesia Pre-Procedure Evaluation    Patient: Edmar Forrest   MRN:     [id number] Gender:   male   Age:    48 year old :      1970        Preoperative Diagnosis: MAC Egd per Shahana   Procedure(s):  ESOPHAGOGASTRODUODENOSCOPY, WITH CO2 INSUFFLATION     Past Medical History:   Diagnosis Date     Anemia     secondary to esophageal perforation     Cardiac arrest (H)     due to esophageal perforation     Esophageal perforation     due to excessive vomiting      Past Surgical History:   Procedure Laterality Date     ARTHROSCOPY KNEE RT/LT       COMBINED ESOPHAGOSCOPY, GASTROSCOPY, DUODENOSCOPY (EGD) WITH CO2 INSUFFLATION N/A 2019    Procedure: COMBINED ESOPHAGOSCOPY, GASTROSCOPY, DUODENOSCOPY (EGD) WITH CO2 INSUFFLATION;  Surgeon: Maciej Harkins MD;  Location:  OR     ESOPHAGOSCOPY, GASTROSCOPY, DUODENOSCOPY (EGD), COMBINED N/A 2019    Procedure: Combined Esophagoscopy, Gastroscopy, Duodenoscopy (Egd), Biopsy Single Or Multiple;  Surgeon: Maciej Harkins MD;  Location:  OR          Anesthesia Evaluation     .             ROS/MED HX    ENT/Pulmonary:  - neg pulmonary ROS     Neurologic:  - neg neurologic ROS     Cardiovascular: Comment: Hx of cardiac arrest after suffering esophageal tear and subsequent significant hemorrhage - neg cardiovascular ROS       METS/Exercise Tolerance:     Hematologic:  - neg hematologic  ROS       Musculoskeletal:  - neg musculoskeletal ROS       GI/Hepatic: Comment: Hx of esophageal tear - neg GI/hepatic ROS       Renal/Genitourinary:  - ROS Renal section negative       Endo:  - neg endo ROS       Psychiatric:  - neg psychiatric ROS       Infectious Disease:  - neg infectious disease ROS       Malignancy:      - no malignancy   Other: Comment: Etoh abuse   - neg other ROS                     PHYSICAL EXAM:   Mental Status/Neuro: A/A/O   Airway: Facies: Feasible  Mallampati: I  Mouth/Opening: Full  TM distance: > 6 cm  Neck  "ROM: Full   Respiratory: Auscultation: CTAB     Resp. Rate: Normal     Resp. Effort: Normal      CV: Rhythm: Regular  Rate: Age appropriate  Heart: Normal Sounds   Comments:      Dental: Normal                  Lab Results   Component Value Date    WBC 5.5 04/10/2019    HGB 14.6 04/10/2019    HCT 43.2 04/10/2019     04/10/2019     04/10/2019    POTASSIUM 4.3 04/10/2019    CHLORIDE 110 (H) 04/10/2019    CO2 26 04/10/2019    BUN 24 04/10/2019    CR 1.02 04/10/2019    GLC 98 04/10/2019    CHRISTINA 9.0 04/10/2019    ALBUMIN 3.8 10/29/2018    PROTTOTAL 6.2 (L) 10/29/2018    ALT 40 10/29/2018    AST 24 10/29/2018    ALKPHOS 34 (L) 10/29/2018    BILITOTAL 0.9 10/29/2018    TSH 0.96 10/29/2018       Preop Vitals  BP Readings from Last 3 Encounters:   05/08/19 136/83   04/10/19 128/78   02/27/19 127/76    Pulse Readings from Last 3 Encounters:   04/10/19 69   02/27/19 60   02/27/19 63      Resp Readings from Last 3 Encounters:   05/08/19 16   04/10/19 18   02/27/19 18    SpO2 Readings from Last 3 Encounters:   05/08/19 97%   04/10/19 98%   02/27/19 99%      Temp Readings from Last 1 Encounters:   05/08/19 36.6  C (97.8  F) (Temporal)    Ht Readings from Last 1 Encounters:   04/10/19 1.702 m (5' 7\")      Wt Readings from Last 1 Encounters:   04/10/19 82.1 kg (180 lb 14.4 oz)    Estimated body mass index is 28.33 kg/m  as calculated from the following:    Height as of 4/10/19: 1.702 m (5' 7\").    Weight as of 4/10/19: 82.1 kg (180 lb 14.4 oz).     LDA:  Peripheral IV 05/08/19 Right Lower forearm (Active)   Site Assessment WDL 5/8/2019  6:46 AM   Line Status Infusing 5/8/2019  6:46 AM   Phlebitis Scale 0-->no symptoms 5/8/2019  6:46 AM   Dressing Intervention New dressing  5/8/2019  6:46 AM   Number of days: 0            Assessment:   ASA SCORE: 2    NPO Status: > 6 hours since completed Solid Foods   Documentation: H&P complete; Preop Testing complete; Consents complete   Proceeding: Proceed without further " delay  Tobacco Use:  NO Active use of Tobacco/UNKNOWN Tobacco use status     Plan:   Anes. Type:  General   Pre-Induction: Midazolam IV; Acetaminophen PO   Induction:  IV (Standard)   Airway: Oral ETT   Access/Monitoring: PIV   Maintenance: Balanced   Emergence: Procedure Site   Logistics: Same Day Surgery     Postop Pain/Sedation Strategy:  Standard-Options: Opioids PRN     PONV Management:  Adult Risk Factors:, Non-Smoker, Postop Opioids  Prevention: Ondansetron; Propofol Infusion     CONSENT: Direct conversation   Plan and risks discussed with: Patient   Blood Products: Consent Deferred (Minimal Blood Loss)                         Phil De La Fuente MD

## 2019-05-08 NOTE — DISCHARGE INSTRUCTIONS
[facility]  Same-Day Surgery   Adult Discharge Orders & Instructions   For 24 hours after surgery  1. Get plenty of rest.  A responsible adult must stay with you for at least 24 hours after you leave the hospital.   2. Do not drive or use heavy equipment.  If you have weakness or tingling, don't drive or use heavy equipment until this feeling goes away.  3. Do not drink alcohol.  4. Avoid strenuous or risky activities.  Ask for help when climbing stairs.   5. You may feel lightheaded.  IF so, sit for a few minutes before standing.  Have someone help you get up.   6. If you have nausea (feel sick to your stomach): Drink only clear liquids such as apple juice, ginger ale, broth or 7-Up.  Rest may also help.  Be sure to drink enough fluids.  Move to a regular diet as you feel able.  7. You may have a slight fever. Call the doctor if your fever is over 100 F (37.7 C) (taken under the tongue) or lasts longer than 24 hours.  8. You may have a dry mouth, a sore throat, muscle aches or trouble sleeping.  These should go away after 24 hours.  9. Do not make important or legal decisions.   Call your doctor for any of the followin.  Signs of infection (fever, growing tenderness at the surgery site, a large amount of drainage or bleeding, severe pain, foul-smelling drainage, redness, swelling).    2. It has been over 8 to 10 hours since surgery and you are still not able to urinate (pass water).    3.  Headache for over 24 hours.      To contact a doctor, call ___[phone]________________________

## 2019-05-08 NOTE — ANESTHESIA CARE TRANSFER NOTE
Patient: Edmar ESCOBEDO Lon    Procedure(s):  ESOPHAGOGASTRODUODENOSCOPY, WITH CO2 INSUFFLATION  Esophagogastroduodenoscopy, With Biopsy    Diagnosis: MAC Egd per Shahana  Diagnosis Additional Information: No value filed.    Anesthesia Type:   No value filed.     Note:  Airway :Room Air  Patient transferred to:Phase II  Comments: Awake, comfortable, sats 99%, Report to RN.Handoff Report: Identifed the Patient, Identified the Reponsible Provider, Reviewed the pertinent medical history, Discussed the surgical course, Reviewed Intra-OP anesthesia mangement and issues during anesthesia, Set expectations for post-procedure period and Allowed opportunity for questions and acknowledgement of understanding      Vitals: (Last set prior to Anesthesia Care Transfer)    CRNA VITALS  5/8/2019 0735 - 5/8/2019 0809      5/8/2019             SpO2:  98 %                Electronically Signed By: YOU More CRNA  May 8, 2019  8:09 AM

## 2019-05-08 NOTE — ANESTHESIA POSTPROCEDURE EVALUATION
Anesthesia POST Procedure Evaluation    Patient: Edmar ESCOBEDO Lon   MRN:     [id number] Gender:   male   Age:    48 year old :      1970        Preoperative Diagnosis: MAC Egd per Shahana   Procedure(s):  ESOPHAGOGASTRODUODENOSCOPY, WITH CO2 INSUFFLATION  Esophagogastroduodenoscopy, With Biopsy   Postop Comments: No value filed.       Anesthesia Type:  General  No value filed.    Reportable Event: NO     PAIN: Uncomplicated   Sign Out status: Comfortable, Well controlled pain     PONV: No PONV   Sign Out status:  No Nausea or Vomiting     Neuro/Psych: Uneventful perioperative course   Sign Out Status: Preoperative baseline; Age appropriate mentation     Airway/Resp.: Uneventful perioperative course   Sign Out Status: Non labored breathing, age appropriate RR; Resp. Status within EXPECTED Parameters     CV: Uneventful perioperative course   Sign Out status: Appropriate BP and perfusion indices; Appropriate HR/Rhythm     Disposition:   Sign Out in:  PACU  Disposition:  Phase II; Home  Recovery Course: Uneventful  Follow-Up: Not required           Last Anesthesia Record Vitals:  CRNA VITALS  2019 0735 - 2019 0835      2019             SpO2:  98 %          Last PACU Vitals:  Vitals Value Taken Time   BP     Temp     Pulse 56 2019  8:00 AM   Resp     SpO2     Temp src Skin 2019  8:00 AM   NIBP 105/65 2019  8:00 AM   Pulse 57 2019  8:04 AM   SpO2 98 % 2019  8:05 AM   Resp     Temp     Ht Rate 61 2019  8:04 AM   Temp 2           Electronically Signed By: Phil De La Fuente MD, May 8, 2019, 2:10 PM

## 2019-05-08 NOTE — LETTER
May 9, 2019      Edmar Forrest  [id number]  [place] [address]        Mr. Forrest,     Your endoscopy pathology results are still pending. As a reminder GI is requesting you return to them in 3 months. Please contact them if you have further questions about the results.     Sincerely,     YOU Sandhu, NP-C/anupama   [facility] Orders   UPPER GI ENDOSCOPY   Result Value Ref Range    Upper GI Endoscopy       [facility]  Endoscopy Department-[place]  _______________________________________________________________________________  Patient Name: Edmar Forrest             Procedure Date: 5/8/2019 6:58 AM  MRN: [id number]                       YOB: 1970  Admit Type: Outpatient                Age: 48  Gender: Male                          Note Status: Finalized  Attending MD: Maciej Harkins MD  Instrument Name: GIF  [id number]  _______________________________________________________________________________     Procedure:                Upper GI endoscopy  Indications:              History of esophageal leukoplakia and concurrent                             eosinophilic esophagitis now on treatment with                             budesonide. here for assessment of disease activity                             and response to treatment.  Providers:                Maciej Harkins MD, Kym Dumont  Referring MD:             Lori Hoff  Medic sterling:                Monitored Anesthesia Care  Complications:            No immediate complications.  _______________________________________________________________________________  Procedure:                Pre-Anesthesia Assessment:                            - Prior to the procedure, a History and Physical                             was performed, and patient medications and                             allergies were reviewed. The patient is competent.                             The risks  and benefits of the procedure and the                             sedation options and risks were discussed with the                             patient. All questions were answered and informed                             consent was obtained. Patient identification and                             proposed procedure were verified by the physician                             and the nurse in the pre-procedure area in the                             endoscopy suite. Mental Status Examination: al ert                             and oriented. Airway Examination: normal                             oropharyngeal airway and neck mobility and                             Mallampati Class I (tonsillar pillars visualized).                             Respiratory Examination: clear to auscultation. CV                             Examination: normal. Prophylactic Antibiotics: The                             patient does not require prophylactic antibiotics.                             Prior Anticoagulants: The patient has taken no                             previous anticoagulant or antiplatelet agents. ASA                             Grade Assessment: II - A patient with mild systemic                             disease. After reviewing the risks and benefits,                             the patient was deemed in satisfactory condition to                             undergo the procedure. The anesthesia plan was to                             use monitored anesthesia care (MAC). Immedi ately                             prior to administration of medications, the patient                             was re-assessed for adequacy to receive sedatives.                             The heart rate, respiratory rate, oxygen                             saturations, blood pressure, adequacy of pulmonary                             ventilation, and response to care were monitored                             throughout the procedure. The  physical status of                             the patient was re-assessed after the procedure.                            After obtaining informed consent, the endoscope was                             passed under direct vision. Throughout the                             procedure, the patient's blood pressure, pulse, and                             oxygen saturations were monitored continuously. The                             Endoscope was introduced through the mouth, and                             advanced to the second part of duodenum. T he upper                             GI endoscopy was accomplished without difficulty.                             The patient tolerated the procedure well.                                                                                   Findings:       Esophagogastric landmarks were identified: the Z-line was found at 38        cm, the gastroesophageal junction was found at 38 cm and the site of        hiatal narrowing was found at 40 cm from the incisors.       The gastroesophageal flap valve was visualized endoscopically and        classified as Hill Grade IV (no fold, wide open lumen, hiatal hernia        present).       A 2 cm hiatal hernia was present.       The proximal esophagus was normal. Biopsies were taken with a cold        forceps for histology to evalaute for EoE. No luminal narrowing in the        proximal esophagus. Verification of patient identification for the        specimen was done. Estimated blood loss was minimal.       White snakeskin like mucosa was present n early circumferentially from        31cm to the GEJ at 38cm. There were a few small patches of intervening        normal appearing pink mucosa. There were no nodules or masses noted.        Esophagus carefully inspected under HDWL and NBI. No visible lesions        other than the mucosal abnormality. Overall, the luminal caliber        appeared improved compared to previous examination.  Biopsies were taken        to r/o squamous cell carcinoma in a 4 quadrant fashion from the        following: GEJ, esophagus 36-38cm, esophagus 34-36cm, esophagus 32-34cm,        esophagus 31-32cm. No dilation was performed or indicated.       The entire examined stomach was normal.       The cardia and gastric fundus were normal on retroflexion.       The ampulla, duodenal bulb, first portion of the duodenum and second        portion of the duodenum were normal.                                                                                   Moderate Sedation:       Moderate Sedation was not administer ed  Impression:               - Esophagogastric landmarks identified.                            - Gastroesophageal flap valve classified as Hill                             Grade IV (no fold, wide open lumen, hiatal hernia                             present).                            - 2 cm hiatal hernia.                            - Normal proximal esophagus. Biopsied.                            - White snakeskin like mucosa was present nearly                             circumferentially from 31cm to the GEJ at 38cm.                             There were a few small patches of intervening                             normal appearing pink mucosa. There were no nodules                             or masses noted. Esophagus carefully inspected                             under HDWL and NBI. No visible lesions other than                             the mucosal abnormality. Overall, the luminal                             caliber appeared improved compared to previous                              examination. Biopsies were taken to r/o squamous                             cell carcinoma in a 4 quadrant fashion from the                             following: GEJ, esophagus 36-38cm, esophagus                             34-36cm, esophagus 32-34cm, esophagus 31-32cm.                            - Normal stomach.                             - Normal ampulla, duodenal bulb, first portion of                             the duodenum and second portion of the duodenum.  Recommendation:           - Discharge patient to home (ambulatory).                            - Patient has a contact number available for                             emergencies. The signs and symptoms of potential                             delayed complications were discussed with the                             patient. Return to normal activities tomorrow.                             Written discharge instructions were provided to the                             patient.                            - Co ntinue present medications. Continue budesonide                             swallowed as prescribed.                            - Await pathology results.                            - Return to GI office in 3 months.                                                                                       ________________________  Maciej Harkins MD  5/8/2019 8:15:03 AM  I was physically present for the entire viewing portion of the exam.Maciej Harkins MD  Number of Addenda: 0    Note Initiated On: 5/8/2019 6:58 AM  Scope In:  Scope Out:

## 2019-05-14 LAB — COPATH REPORT: NORMAL

## (undated) DEVICE — SOL WATER IRRIG 1000ML BOTTLE 07139-09

## (undated) DEVICE — PREP CHLORAPREP 26ML TINTED ORANGE  260815

## (undated) RX ORDER — PROPOFOL 10 MG/ML
INJECTION, EMULSION INTRAVENOUS
Status: DISPENSED
Start: 2019-05-08

## (undated) RX ORDER — EPINEPHRINE 1 MG/ML
INJECTION, SOLUTION INTRAMUSCULAR; SUBCUTANEOUS
Status: DISPENSED
Start: 2019-02-27

## (undated) RX ORDER — LIDOCAINE HYDROCHLORIDE 20 MG/ML
INJECTION, SOLUTION INFILTRATION; PERINEURAL
Status: DISPENSED
Start: 2019-05-08

## (undated) RX ORDER — DEXMEDETOMIDINE HYDROCHLORIDE 100 UG/ML
INJECTION, SOLUTION INTRAVENOUS
Status: DISPENSED
Start: 2019-05-08

## (undated) RX ORDER — PROPOFOL 10 MG/ML
INJECTION, EMULSION INTRAVENOUS
Status: DISPENSED
Start: 2019-02-27

## (undated) RX ORDER — SIMETHICONE 40MG/0.6ML
SUSPENSION, DROPS(FINAL DOSAGE FORM)(ML) ORAL
Status: DISPENSED
Start: 2019-02-27

## (undated) RX ORDER — SIMETHICONE 40MG/0.6ML
SUSPENSION, DROPS(FINAL DOSAGE FORM)(ML) ORAL
Status: DISPENSED
Start: 2019-05-08